# Patient Record
Sex: MALE | Race: WHITE | NOT HISPANIC OR LATINO | Employment: UNEMPLOYED | URBAN - METROPOLITAN AREA
[De-identification: names, ages, dates, MRNs, and addresses within clinical notes are randomized per-mention and may not be internally consistent; named-entity substitution may affect disease eponyms.]

---
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
## 2020-07-29 ENCOUNTER — HOSPITAL ENCOUNTER (EMERGENCY)
Facility: HOSPITAL | Age: 20
Discharge: HOME | End: 2020-07-29
Attending: EMERGENCY MEDICINE | Admitting: EMERGENCY MEDICINE

## 2020-07-29 VITALS
DIASTOLIC BLOOD PRESSURE: 81 MMHG | SYSTOLIC BLOOD PRESSURE: 158 MMHG | HEART RATE: 100 BPM | WEIGHT: 315 LBS | RESPIRATION RATE: 18 BRPM | OXYGEN SATURATION: 98 % | TEMPERATURE: 98.1 F | BODY MASS INDEX: 55.61 KG/M2

## 2020-07-29 DIAGNOSIS — K04.7 DENTAL ABSCESS: Primary | ICD-10-CM

## 2020-07-29 PROCEDURE — 99282 EMERGENCY DEPT VISIT SF MDM: CPT

## 2020-07-29 PROCEDURE — 99284 EMERGENCY DEPT VISIT MOD MDM: CPT | Performed by: PHYSICIAN ASSISTANT

## 2020-07-29 PROCEDURE — 41800 DRAINAGE OF GUM LESION: CPT | Performed by: PHYSICIAN ASSISTANT

## 2020-07-29 RX ORDER — LIDOCAINE HYDROCHLORIDE 20 MG/ML
15 SOLUTION OROPHARYNGEAL ONCE
Status: COMPLETED | OUTPATIENT
Start: 2020-07-29 | End: 2020-07-29

## 2020-07-29 RX ORDER — NAPROXEN 500 MG/1
500 TABLET ORAL 2 TIMES DAILY WITH MEALS
Qty: 14 TABLET | Refills: 0 | Status: SHIPPED | OUTPATIENT
Start: 2020-07-29 | End: 2022-07-20

## 2020-07-29 RX ORDER — PENICILLIN V POTASSIUM 500 MG/1
500 TABLET ORAL 4 TIMES DAILY
Qty: 40 TABLET | Refills: 0 | Status: SHIPPED | OUTPATIENT
Start: 2020-07-29 | End: 2020-08-08

## 2020-07-29 RX ADMIN — LIDOCAINE HYDROCHLORIDE 15 ML: 20 SOLUTION ORAL; TOPICAL at 10:37

## 2022-07-07 ENCOUNTER — HOSPITAL ENCOUNTER (EMERGENCY)
Facility: HOSPITAL | Age: 22
Discharge: HOME | End: 2022-07-07
Attending: EMERGENCY MEDICINE
Payer: COMMERCIAL

## 2022-07-07 VITALS
RESPIRATION RATE: 18 BRPM | HEART RATE: 85 BPM | OXYGEN SATURATION: 95 % | SYSTOLIC BLOOD PRESSURE: 174 MMHG | BODY MASS INDEX: 55.46 KG/M2 | WEIGHT: 315 LBS | TEMPERATURE: 96.4 F | DIASTOLIC BLOOD PRESSURE: 91 MMHG

## 2022-07-07 DIAGNOSIS — G43.909 MIGRAINE: Primary | ICD-10-CM

## 2022-07-07 DIAGNOSIS — S16.1XXA STRAIN OF NECK MUSCLE, INITIAL ENCOUNTER: ICD-10-CM

## 2022-07-07 PROCEDURE — 99284 EMERGENCY DEPT VISIT MOD MDM: CPT | Performed by: PHYSICIAN ASSISTANT

## 2022-07-07 PROCEDURE — 99283 EMERGENCY DEPT VISIT LOW MDM: CPT

## 2022-07-07 PROCEDURE — 96372 THER/PROPH/DIAG INJ SC/IM: CPT

## 2022-07-07 RX ORDER — KETOROLAC TROMETHAMINE 30 MG/ML
15 INJECTION, SOLUTION INTRAMUSCULAR; INTRAVENOUS ONCE
Status: COMPLETED | OUTPATIENT
Start: 2022-07-07 | End: 2022-07-07

## 2022-07-07 RX ORDER — DIPHENHYDRAMINE HCL 25 MG
50 TABLET ORAL ONCE
Status: COMPLETED | OUTPATIENT
Start: 2022-07-07 | End: 2022-07-07

## 2022-07-07 RX ORDER — CYCLOBENZAPRINE HCL 10 MG
10 TABLET ORAL 2 TIMES DAILY PRN
Qty: 20 TABLET | Refills: 0 | Status: SHIPPED | OUTPATIENT
Start: 2022-07-07 | End: 2022-07-20

## 2022-07-07 RX ORDER — ONDANSETRON 4 MG/1
4 TABLET, ORALLY DISINTEGRATING ORAL ONCE
Status: COMPLETED | OUTPATIENT
Start: 2022-07-07 | End: 2022-07-07

## 2022-07-07 RX ORDER — METOCLOPRAMIDE HYDROCHLORIDE 5 MG/ML
10 INJECTION INTRAMUSCULAR; INTRAVENOUS ONCE
Status: DISCONTINUED | OUTPATIENT
Start: 2022-07-07 | End: 2022-07-07

## 2022-07-07 RX ORDER — KETOROLAC TROMETHAMINE 10 MG/1
10 TABLET, FILM COATED ORAL EVERY 6 HOURS PRN
Qty: 20 TABLET | Refills: 0 | Status: SHIPPED | OUTPATIENT
Start: 2022-07-07 | End: 2022-07-20

## 2022-07-07 RX ORDER — KETOROLAC TROMETHAMINE 30 MG/ML
15 INJECTION, SOLUTION INTRAMUSCULAR; INTRAVENOUS ONCE
Status: DISCONTINUED | OUTPATIENT
Start: 2022-07-07 | End: 2022-07-07

## 2022-07-07 RX ORDER — CYCLOBENZAPRINE HCL 10 MG
10 TABLET ORAL ONCE
Status: COMPLETED | OUTPATIENT
Start: 2022-07-07 | End: 2022-07-07

## 2022-07-07 RX ORDER — DIPHENHYDRAMINE HYDROCHLORIDE 50 MG/ML
50 INJECTION INTRAMUSCULAR; INTRAVENOUS ONCE
Status: DISCONTINUED | OUTPATIENT
Start: 2022-07-07 | End: 2022-07-07

## 2022-07-07 RX ADMIN — CYCLOBENZAPRINE HYDROCHLORIDE 10 MG: 10 TABLET, FILM COATED ORAL at 20:47

## 2022-07-07 RX ADMIN — KETOROLAC TROMETHAMINE 15 MG: 30 INJECTION, SOLUTION INTRAMUSCULAR; INTRAVENOUS at 20:52

## 2022-07-07 RX ADMIN — DIPHENHYDRAMINE HCL 50 MG: 25 TABLET ORAL at 20:52

## 2022-07-07 RX ADMIN — ONDANSETRON 4 MG: 4 TABLET, ORALLY DISINTEGRATING ORAL at 20:53

## 2022-07-07 NOTE — Clinical Note
Kasey Jones was seen and treated in our emergency department on 7/7/2022  Diagnosis:     [place]    He may return on this date: 07/08/2022         If you have any questions or concerns, please don't hesitate to call        Matt Galvan PA-C    ______________________________           _______________          _______________  Hospital Representative                              Date                                Time

## 2022-07-08 NOTE — ED PROVIDER NOTES
History  Chief Complaint   Patient presents with    Eye Problem     Patient having blurry vision and states his vision has been going "in and out"     Neck Pain     Patient having upper back and neck pain for a week      Patient presents for an evaluation of headache ongoing intermittently x3 weeks as well as L sided paraspinal cervical musculature pain x1 week  Denies any injury  Patient was seen and evaluated for both issues on 07/03 at [facility]  He does have a follow up with neurology tomorrow  Symptoms will typically resolve after taking NSAIDs  Most recently took Aleve which did help  Headache is usually at his temples and has associated photophobia  Denies any numbness, tingling, weakness, nausea, vomiting, decrease in [place], syncope  No other complaints  Prior to Admission Medications   Prescriptions Last Dose Informant Patient Reported? Taking?   escitalopram (LEXAPRO) 10 mg tablet   No No   Sig: Take 1 tablet by mouth daily At 9AM   naproxen (NAPROSYN) 500 mg tablet   No No   Sig: Take 1 tablet (500 mg total) by mouth 2 (two) times a day with meals for 7 days      Facility-Administered Medications: None       Past Medical History:   Diagnosis Date    Depression     Psychiatric illness     Self-injurious behavior     scratches on L forearm, started 2 days ago, denies previous self-injury       History reviewed  No pertinent surgical history  Family History   Problem Relation Age of Onset    Bipolar disorder Mother     Bipolar disorder Father      I have reviewed and agree with the history as documented      E-Cigarette/Vaping    E-Cigarette Use Never User      E-Cigarette/Vaping Substances    Nicotine No     THC No     CBD No     Flavoring No     Other No      Social History     Tobacco Use    Smoking status: Current Every Day Smoker     Packs/day: 0 50     Years: 1 00     Pack years: 0 50     Types: Cigarettes    Smokeless tobacco: Never Used   Vaping Use    Vaping Use: Never used   Substance Use Topics    Alcohol use: No    Drug use: Yes     Frequency: 1 0 times per week     Types: Marijuana       Review of Systems   Constitutional: Negative for chills and fever  HENT: Negative for congestion, ear pain and sore throat  Eyes: Positive for photophobia  Negative for pain  Respiratory: Negative for cough and shortness of breath  Cardiovascular: Negative for chest pain  Gastrointestinal: Negative for abdominal pain, nausea and vomiting  Genitourinary: Negative for dysuria  Musculoskeletal: Positive for neck pain  Negative for back pain  Skin: Negative for rash  Neurological: Positive for headaches  Negative for dizziness, weakness and numbness  Psychiatric/Behavioral: Negative for suicidal ideas  All other systems reviewed and are negative  Physical Exam  Physical Exam  Vitals reviewed  Constitutional:       General: He is not in acute distress  Appearance: He is well-developed  He is obese  He is not diaphoretic  HENT:      Head: Normocephalic and atraumatic  Right Ear: External ear normal       Left Ear: External ear normal       Nose: Nose normal       Mouth/Throat:      Mouth: Mucous membranes are moist       Pharynx: Oropharynx is clear  Eyes:      Extraocular Movements: Extraocular movements intact  Pupils: Pupils are equal, round, and reactive to light  Cardiovascular:      Rate and Rhythm: Normal rate and regular rhythm  Heart sounds: Normal heart sounds  Pulmonary:      Effort: Pulmonary effort is normal       Breath sounds: Normal breath sounds  Abdominal:      General: Bowel sounds are normal       Palpations: Abdomen is soft  Tenderness: There is no abdominal tenderness  Musculoskeletal:         General: Normal range of motion  Cervical back: Normal range of motion and neck supple  Skin:     General: Skin is warm and dry     Neurological:      Mental Status: He is alert and oriented to person, place, and time       GCS: GCS eye subscore is 4  GCS verbal subscore is 5  GCS motor subscore is 6  Cranial Nerves: Cranial nerves are intact  Sensory: Sensation is intact  Motor: Motor function is intact  Coordination: Coordination is intact  Gait: Gait is intact  Comments: GCS 15  AAOx4  No focal neurological deficits  CN II-XII intact  PERRL  EOMI  No pronator drift   strength 5/5 bilaterally  B/L UE strength 5/5 throughout  Finger to nose normal  Cerebellar function normal  Ambulates without difficulty  B/L LE strength 5/5 throughout   Gross sensation to b/l upper and lower extremities intact              Vital Signs  ED Triage Vitals   Temperature Pulse Respirations Blood Pressure SpO2   07/07/22 2016 07/07/22 2016 07/07/22 2016 07/07/22 2016 07/07/22 2016   (!) 96 4 °F (35 8 °C) 85 18 (!) 174/91 95 %      Temp Source Heart Rate Source Patient Position - Orthostatic VS BP Location FiO2 (%)   07/07/22 2016 07/07/22 2016 07/07/22 2016 07/07/22 2016 --   Tympanic Monitor Sitting Left arm       Pain Score       07/07/22 2052       6           Vitals:    07/07/22 2016   BP: (!) 174/91   Pulse: 85   Patient Position - Orthostatic VS: Sitting         Visual Acuity      ED Medications  Medications   cyclobenzaprine (FLEXERIL) tablet 10 mg (10 mg Oral Given 7/7/22 2047)   diphenhydrAMINE (BENADRYL) tablet 50 mg (50 mg Oral Given 7/7/22 2052)   ketorolac (TORADOL) injection 15 mg (15 mg Intramuscular Given 7/7/22 2052)   ondansetron (ZOFRAN-ODT) dispersible tablet 4 mg (4 mg Oral Given 7/7/22 2053)       Diagnostic Studies  Results Reviewed     None                 No orders to display              Procedures  Procedures         ED Course  ED Course as of 07/07/22 2120   Th Jul 07, 2022 2031 States symptoms had resolved with migraine cocktail at last visit - will administer again   2049 Refusing IV and requesting PO instead                                             MDM  Number of Diagnoses or Management Options  Migraine  Strain of neck muscle, initial encounter  Diagnosis management comments: Refused IV requesting PO medications instead  Symptoms resolved soon after  Also requesting work note  Instructed to follow up with scheduled neurology appointment tomorrow  Agreeable with plan    Patient verbalizes understanding and agrees with plan  The management plan was discussed in detail with the patient at bedside and all questions were answered  Prior to discharge, I provided both verbal and written instructions  I discussed with the patient the signs and symptoms for which to return to the emergency department  All questions were answered and patient was comfortable with the plan of care and discharged to home  The patient agrees to return to the Emergency Department for concerns and/or progression of illness  Disposition  Final diagnoses:   Migraine   Strain of neck muscle, initial encounter     Time reflects when diagnosis was documented in both MDM as applicable and the Disposition within this note     Time User Action Codes Description Comment    7/7/2022  9:09 PM Hair Wells Add [G43 909] Migraine     7/7/2022  9:09 PM Mar Wells Add [Q78  1XXA] Strain of neck muscle, initial encounter       ED Disposition     ED Disposition   Discharge    Condition   Stable    Date/Time   Thu Jul 7, 2022  9:09 PM    Comment   Christal Flor discharge to home/self care                 Follow-up Information     Follow up With Specialties Details Why Contact Info Additional [address]   [address], [address] [facility] [address]  [address], [address], [address] [place], [place], [address]    Neurology [facility]   [address] Allyn Sicard [address] [address] [phone] Neurology [facility], [address] [place], [place], [address] [facility]          Discharge Medication List as of 7/7/2022  9:10 PM      START taking these medications    Details   cyclobenzaprine (FLEXERIL) 10 mg tablet Take 1 tablet (10 mg total) by mouth 2 (two) times a day as needed for muscle spasms, Starting Thu 7/7/2022, Normal      ketorolac (TORADOL) 10 mg tablet Take 1 tablet (10 mg total) by mouth every 6 (six) hours as needed for moderate pain, Starting u 7/7/2022, Normal         CONTINUE these medications which have NOT CHANGED    Details   escitalopram (LEXAPRO) 10 mg tablet Take 1 tablet by mouth daily At 9AM, Starting 6/2/2017, Until Discontinued, Print      naproxen (NAPROSYN) 500 mg tablet Take 1 tablet (500 mg total) by mouth 2 (two) times a day with meals for 7 days, Starting Wed 7/29/2020, Until Wed 8/5/2020, Normal             No discharge procedures on file      PDMP Review     None          ED Provider  Electronically Signed by           Jaime Valera PA-C  07/07/22 8621

## 2022-07-08 NOTE — DISCHARGE INSTRUCTIONS
Please ensure you are following up with your scheduled neurology appointment tomorrow  Use medication as prescribed  Please return to the ER with any worsening symptoms

## 2022-07-13 ENCOUNTER — APPOINTMENT (EMERGENCY)
Dept: RADIOLOGY | Facility: HOSPITAL | Age: 22
DRG: 058 | End: 2022-07-13
Payer: COMMERCIAL

## 2022-07-13 ENCOUNTER — HOSPITAL ENCOUNTER (INPATIENT)
Facility: HOSPITAL | Age: 22
LOS: 7 days | Discharge: ANOTHER HOSPITAL | DRG: 058 | End: 2022-07-20
Attending: EMERGENCY MEDICINE | Admitting: INTERNAL MEDICINE
Payer: COMMERCIAL

## 2022-07-13 DIAGNOSIS — H54.7 DECREASED VISUAL ACUITY: Primary | ICD-10-CM

## 2022-07-13 DIAGNOSIS — G93.2 IIH (IDIOPATHIC INTRACRANIAL HYPERTENSION): ICD-10-CM

## 2022-07-13 DIAGNOSIS — R51.9 ACUTE NONINTRACTABLE HEADACHE, UNSPECIFIED HEADACHE TYPE: ICD-10-CM

## 2022-07-13 PROBLEM — H54.3: Status: ACTIVE | Noted: 2022-07-13

## 2022-07-13 LAB
ANION GAP SERPL CALCULATED.3IONS-SCNC: 6 MMOL/L (ref 4–13)
BASOPHILS # BLD AUTO: 0.04 THOUSANDS/ΜL (ref 0–0.1)
BASOPHILS NFR BLD AUTO: 0 % (ref 0–1)
BUN SERPL-MCNC: 12 MG/DL (ref 5–25)
CALCIUM SERPL-MCNC: 9.8 MG/DL (ref 8.3–10.1)
CHLORIDE SERPL-SCNC: 105 MMOL/L (ref 100–108)
CO2 SERPL-SCNC: 26 MMOL/L (ref 21–32)
CREAT SERPL-MCNC: 0.85 MG/DL (ref 0.6–1.3)
EOSINOPHIL # BLD AUTO: 0.12 THOUSAND/ΜL (ref 0–0.61)
EOSINOPHIL NFR BLD AUTO: 1 % (ref 0–6)
ERYTHROCYTE [DISTWIDTH] IN BLOOD BY AUTOMATED COUNT: 12.8 % (ref 11.6–15.1)
GFR SERPL CREATININE-BSD FRML MDRD: 121 ML/MIN/1.73SQ M
GLUCOSE SERPL-MCNC: 90 MG/DL (ref 65–140)
HCT VFR BLD AUTO: 52.7 % (ref 36.5–49.3)
HGB BLD-MCNC: 18.2 G/DL (ref 12–17)
IMM GRANULOCYTES # BLD AUTO: 0.07 THOUSAND/UL (ref 0–0.2)
IMM GRANULOCYTES NFR BLD AUTO: 1 % (ref 0–2)
LYMPHOCYTES # BLD AUTO: 1.34 THOUSANDS/ΜL (ref 0.6–4.47)
LYMPHOCYTES NFR BLD AUTO: 13 % (ref 14–44)
MCH RBC QN AUTO: 30.6 PG (ref 26.8–34.3)
MCHC RBC AUTO-ENTMCNC: 34.5 G/DL (ref 31.4–37.4)
MCV RBC AUTO: 89 FL (ref 82–98)
MONOCYTES # BLD AUTO: 0.88 THOUSAND/ΜL (ref 0.17–1.22)
MONOCYTES NFR BLD AUTO: 8 % (ref 4–12)
NEUTROPHILS # BLD AUTO: 8.12 THOUSANDS/ΜL (ref 1.85–7.62)
NEUTS SEG NFR BLD AUTO: 77 % (ref 43–75)
NRBC BLD AUTO-RTO: 0 /100 WBCS
PLATELET # BLD AUTO: 178 THOUSANDS/UL (ref 149–390)
PMV BLD AUTO: 10.2 FL (ref 8.9–12.7)
POTASSIUM SERPL-SCNC: 4.2 MMOL/L (ref 3.5–5.3)
RBC # BLD AUTO: 5.95 MILLION/UL (ref 3.88–5.62)
SODIUM SERPL-SCNC: 137 MMOL/L (ref 136–145)
WBC # BLD AUTO: 10.57 THOUSAND/UL (ref 4.31–10.16)

## 2022-07-13 PROCEDURE — 70450 CT HEAD/BRAIN W/O DYE: CPT

## 2022-07-13 PROCEDURE — 36415 COLL VENOUS BLD VENIPUNCTURE: CPT

## 2022-07-13 PROCEDURE — 80048 BASIC METABOLIC PNL TOTAL CA: CPT

## 2022-07-13 PROCEDURE — 93005 ELECTROCARDIOGRAM TRACING: CPT

## 2022-07-13 PROCEDURE — 99285 EMERGENCY DEPT VISIT HI MDM: CPT | Performed by: EMERGENCY MEDICINE

## 2022-07-13 PROCEDURE — G1004 CDSM NDSC: HCPCS

## 2022-07-13 PROCEDURE — 70496 CT ANGIOGRAPHY HEAD: CPT

## 2022-07-13 PROCEDURE — 85025 COMPLETE CBC W/AUTO DIFF WBC: CPT

## 2022-07-13 PROCEDURE — 96374 THER/PROPH/DIAG INJ IV PUSH: CPT

## 2022-07-13 PROCEDURE — 70553 MRI BRAIN STEM W/O & W/DYE: CPT

## 2022-07-13 PROCEDURE — 70498 CT ANGIOGRAPHY NECK: CPT

## 2022-07-13 PROCEDURE — 99285 EMERGENCY DEPT VISIT HI MDM: CPT

## 2022-07-13 PROCEDURE — 70543 MRI ORBT/FAC/NCK W/O &W/DYE: CPT

## 2022-07-13 RX ORDER — TETRACAINE HYDROCHLORIDE 5 MG/ML
2 SOLUTION OPHTHALMIC ONCE
Status: DISCONTINUED | OUTPATIENT
Start: 2022-07-13 | End: 2022-07-13

## 2022-07-13 RX ORDER — LORAZEPAM 2 MG/ML
1 INJECTION INTRAMUSCULAR ONCE
Status: COMPLETED | OUTPATIENT
Start: 2022-07-13 | End: 2022-07-13

## 2022-07-13 RX ORDER — LORAZEPAM 2 MG/ML
0.5 INJECTION INTRAMUSCULAR ONCE
Status: COMPLETED | OUTPATIENT
Start: 2022-07-13 | End: 2022-07-13

## 2022-07-13 RX ADMIN — IOHEXOL 72 ML: 350 INJECTION, SOLUTION INTRAVENOUS at 21:07

## 2022-07-13 RX ADMIN — LORAZEPAM 1 MG: 2 INJECTION INTRAMUSCULAR; INTRAVENOUS at 23:10

## 2022-07-13 RX ADMIN — LORAZEPAM 0.5 MG: 2 INJECTION INTRAMUSCULAR; INTRAVENOUS at 21:45

## 2022-07-13 NOTE — LETTER
[address] [facility] [address]  [address]  [address] [facility] [address]  Dept: [phone]    July 20, 2022     Patient: Valeriano Aaron   YOB: 2000   Date of Visit: 7/13/2022       To Whom it May Concern:    Valeriano Aaron is under my professional care  He was seen in the hospital from 7/13/2022   to 07/20/22  He May return to work after being cleared by [place] [employer]  [facility]     If you have any questions or concerns, please don't hesitate to call           Sincerely,          Aretha Fink MD

## 2022-07-13 NOTE — ED ATTENDING ATTESTATION
7/13/2022  IVirginia, DO, saw and evaluated the patient  I have discussed the patient with the resident/non-physician practitioner and agree with the resident's/non-physician practitioner's findings, Plan of Care, and MDM as documented in the resident's/non-physician practitioner's note, except where noted  All available labs and Radiology studies were reviewed  I was present for key portions of any procedure(s) performed by the resident/non-physician practitioner and I was immediately available to provide assistance  At this point I agree with the current assessment done in the Emergency Department  I have conducted an independent evaluation of this patient a history and physical is as follows:    Patient is a 57-year-old male accompanied by his friend  Patient says that about 2 weeks ago, the day after he was doing some heavy lifting at the gym, he noticed some pain in his upper neck, greatest on the left-hand side, worse when he turned his head to the right  He also noticed a slight gradual headache that following day  He had some mild photophobia and phonophobia, no visual changes  He was reportedly seen at an outside emergency department on July 3rd, treated with a migraine cocktail, and had improvement of his symptoms  Reportedly they wanted to have further testing and evaluation done but the patient left against medical advice  He was recommended to follow up with Neurology, missed that appointment, then was seen again on July 7th at another outside emergency department, for a mild headache which was of some was some photophobia and phonophobia  No visual changes at that point  He says he refused IV treatment, given oral medications for his headache which improved significantly    Patient says 5 days ago he started to notice some dimming in his vision and decreased visual acuity, which has gotten progressively worse since then to the point now where he has difficulty seeing any shapes and has noticed that if he looks at bright light with his right eye he can barely see the bright light, and he can see a little better when he looks just out of the left eye but it is definitely significantly worse  He thinks that colors are also a little bit more muted as well  He does not wear glasses or contacts, has had no trauma, no one else sick with similar headaches at home, no recent head or neck infections or head or neck surgery  General:  Patient is well-appearing  Head:  Atraumatic  Eyes: EOMI bilaterally  At rest both of his pupils are 4 mm  His left pupil constricts to light, there is no APD and the right pupil constricts when light is shown in the left  His right pupil does not constrict to light and there is left pupillary constriction with shining light in the right  Right eye: Lids & lashes unremarkableconjunctiva pink, sclera white, cornea not cloudy or steamy, no hyphema or hypopyon  Left eye: Lids & lashes unremarkable, conjunctiva pink, sclera white, cornea not cloudy or steamy, no hyphema or hypopyon  Visual acuity: OS hand motion, OD hand motion, OU hand motion  Intra-ocular pressure OS 23, OD 22  ENT:  Mucous membranes are moist  Neck:  Supple  Cardiac:  S1-S2, without murmurs  Lungs:  Clear to auscultation bilaterally  Abdomen:  Soft, nontender, normal bowel sounds, no CVA tenderness, no tympany, no rigidity, no guarding  Extremities:  Normal range of motion  Neurologic:  Awake, fluent speech, normal comprehension  AAOx3  Cranial nerves 2-12 are intact, strength is 5/5 in the bilateral upper & lower extremities, no slurred speech, no facial droop, no deficit on finger-to-nose testing, no pronator drift    Sensation to light touch is equal and symmetric throughout the whole body  Skin:  Pink warm and dry, no rash  Psychiatric:  Alert, cooperative, admits to being anxious about his significant decreased vision    ED Course  ED Course as of 07/13/22 5646 Wed Jul 13, 2022 1955 ECG interpreted me, sinus rhythm, rate of 87, no ST segment elevation or depression, no ischemic or infarct changes, no old for comparison   2017 Patient initial blood pressure was taken with a cuff that was too small, repeat blood pressure with appropriate size cuff was 136/79   2116 Pt seen by neurology Dr Aide Laura, recommends MRI of the brain and orbits with and without contrast, would prefer it be obtained tonight if possible  Will discuss with Radiology to see if that is possible  Radiology approved MRI order, patient admitted to Medicine, Neurology following, they will follow-up on the MRI order and managed patient further      Critical Care Time  Procedures

## 2022-07-14 ENCOUNTER — APPOINTMENT (INPATIENT)
Dept: RADIOLOGY | Facility: HOSPITAL | Age: 22
DRG: 058 | End: 2022-07-14
Payer: COMMERCIAL

## 2022-07-14 LAB
ALBUMIN SERPL BCP-MCNC: 3.3 G/DL (ref 3.5–5)
ALP SERPL-CCNC: 60 U/L (ref 46–116)
ALT SERPL W P-5'-P-CCNC: 44 U/L (ref 12–78)
ANION GAP SERPL CALCULATED.3IONS-SCNC: 6 MMOL/L (ref 4–13)
APPEARANCE CSF: CLEAR
APTT PPP: 27 SECONDS (ref 23–37)
AST SERPL W P-5'-P-CCNC: 22 U/L (ref 5–45)
ATRIAL RATE: 87 BPM
BASOPHILS # BLD AUTO: 0.05 THOUSANDS/ΜL (ref 0–0.1)
BASOPHILS NFR BLD AUTO: 1 % (ref 0–1)
BILIRUB SERPL-MCNC: 0.75 MG/DL (ref 0.2–1)
BUN SERPL-MCNC: 11 MG/DL (ref 5–25)
C GATTII+NEOFOR DNA CSF QL NAA+NON-PROBE: NOT DETECTED
CALCIUM ALBUM COR SERPL-MCNC: 10.1 MG/DL (ref 8.3–10.1)
CALCIUM SERPL-MCNC: 9.5 MG/DL (ref 8.3–10.1)
CHLORIDE SERPL-SCNC: 106 MMOL/L (ref 100–108)
CMV DNA CSF QL NAA+NON-PROBE: NOT DETECTED
CO2 SERPL-SCNC: 27 MMOL/L (ref 21–32)
CREAT SERPL-MCNC: 0.81 MG/DL (ref 0.6–1.3)
CRP SERPL QL: 24.7 MG/L
E COLI K1 DNA CSF QL NAA+NON-PROBE: NOT DETECTED
EOSINOPHIL # BLD AUTO: 0.12 THOUSAND/ΜL (ref 0–0.61)
EOSINOPHIL NFR BLD AUTO: 1 % (ref 0–6)
ERYTHROCYTE [DISTWIDTH] IN BLOOD BY AUTOMATED COUNT: 12.8 % (ref 11.6–15.1)
ERYTHROCYTE [SEDIMENTATION RATE] IN BLOOD: 25 MM/HOUR (ref 0–14)
EST. AVERAGE GLUCOSE BLD GHB EST-MCNC: 108 MG/DL
EV RNA CSF QL NAA+NON-PROBE: NOT DETECTED
GFR SERPL CREATININE-BSD FRML MDRD: 124 ML/MIN/1.73SQ M
GLUCOSE CSF-MCNC: 58 MG/DL (ref 50–80)
GLUCOSE SERPL-MCNC: 89 MG/DL (ref 65–140)
GP B STREP DNA CSF QL NAA+NON-PROBE: NOT DETECTED
GRAM STN SPEC: NORMAL
HAEM INFLU DNA CSF QL NAA+NON-PROBE: NOT DETECTED
HBA1C MFR BLD: 5.4 %
HCT VFR BLD AUTO: 49.2 % (ref 36.5–49.3)
HGB BLD-MCNC: 16.5 G/DL (ref 12–17)
HHV6 DNA CSF QL NAA+NON-PROBE: NOT DETECTED
HIV 1+2 AB+HIV1 P24 AG SERPL QL IA: NORMAL
HSV1 DNA CSF QL NAA+NON-PROBE: NOT DETECTED
HSV2 DNA CSF QL NAA+NON-PROBE: NOT DETECTED
IMM GRANULOCYTES # BLD AUTO: 0.05 THOUSAND/UL (ref 0–0.2)
IMM GRANULOCYTES NFR BLD AUTO: 1 % (ref 0–2)
INR PPP: 1 (ref 0.84–1.19)
L MONOCYTOG DNA CSF QL NAA+NON-PROBE: NOT DETECTED
LACTATE CSF-SCNC: 1.6 MMOL/L (ref 0.6–2.2)
LYMPHOCYTES # BLD AUTO: 1.6 THOUSANDS/ΜL (ref 0.6–4.47)
LYMPHOCYTES NFR BLD AUTO: 17 % (ref 14–44)
LYMPHOCYTES NFR CSF MANUAL: 97 %
MAGNESIUM SERPL-MCNC: 1.8 MG/DL (ref 1.6–2.6)
MCH RBC QN AUTO: 30.2 PG (ref 26.8–34.3)
MCHC RBC AUTO-ENTMCNC: 33.5 G/DL (ref 31.4–37.4)
MCV RBC AUTO: 90 FL (ref 82–98)
MONOCYTES # BLD AUTO: 0.93 THOUSAND/ΜL (ref 0.17–1.22)
MONOCYTES NFR BLD AUTO: 10 % (ref 4–12)
MONOS+MACROS CSF MANUAL: 3 %
N MEN DNA CSF QL NAA+NON-PROBE: NOT DETECTED
NEUTROPHILS # BLD AUTO: 6.6 THOUSANDS/ΜL (ref 1.85–7.62)
NEUTS SEG NFR BLD AUTO: 70 % (ref 43–75)
NRBC BLD AUTO-RTO: 0 /100 WBCS
P AXIS: 60 DEGREES
PARECHOVIRUS A RNA CSF QL NAA+NON-PROBE: NOT DETECTED
PLATELET # BLD AUTO: 162 THOUSANDS/UL (ref 149–390)
PMV BLD AUTO: 10.4 FL (ref 8.9–12.7)
POTASSIUM SERPL-SCNC: 3.8 MMOL/L (ref 3.5–5.3)
PR INTERVAL: 122 MS
PROT CSF-MCNC: 30 MG/DL (ref 15–45)
PROT SERPL-MCNC: 7.4 G/DL (ref 6.4–8.2)
PROTHROMBIN TIME: 12.8 SECONDS (ref 11.6–14.5)
QRS AXIS: 72 DEGREES
QRSD INTERVAL: 84 MS
QT INTERVAL: 320 MS
QTC INTERVAL: 385 MS
RBC # BLD AUTO: 5.47 MILLION/UL (ref 3.88–5.62)
RBC # CSF MANUAL: 16 UL (ref 0–10)
S PNEUM DNA CSF QL NAA+NON-PROBE: NOT DETECTED
SODIUM SERPL-SCNC: 139 MMOL/L (ref 136–145)
T WAVE AXIS: 48 DEGREES
TOTAL CELLS COUNTED BLD: NO
TOTAL CELLS COUNTED SPEC: 29
TUBE # CSF: 4
VENTRICULAR RATE: 87 BPM
VZV DNA CSF QL NAA+NON-PROBE: NOT DETECTED
WBC # BLD AUTO: 9.35 THOUSAND/UL (ref 4.31–10.16)
WBC # CSF AUTO: 3 /UL (ref 0–5)

## 2022-07-14 PROCEDURE — 86039 ANTINUCLEAR ANTIBODIES (ANA): CPT | Performed by: PSYCHIATRY & NEUROLOGY

## 2022-07-14 PROCEDURE — 89051 BODY FLUID CELL COUNT: CPT | Performed by: PSYCHIATRY & NEUROLOGY

## 2022-07-14 PROCEDURE — 87798 DETECT AGENT NOS DNA AMP: CPT | Performed by: PSYCHIATRY & NEUROLOGY

## 2022-07-14 PROCEDURE — 83873 ASSAY OF CSF PROTEIN: CPT | Performed by: PSYCHIATRY & NEUROLOGY

## 2022-07-14 PROCEDURE — 89050 BODY FLUID CELL COUNT: CPT | Performed by: PSYCHIATRY & NEUROLOGY

## 2022-07-14 PROCEDURE — 80053 COMPREHEN METABOLIC PANEL: CPT | Performed by: INTERNAL MEDICINE

## 2022-07-14 PROCEDURE — 87483 CNS DNA AMP PROBE TYPE 12-25: CPT | Performed by: PSYCHIATRY & NEUROLOGY

## 2022-07-14 PROCEDURE — 86038 ANTINUCLEAR ANTIBODIES: CPT | Performed by: PSYCHIATRY & NEUROLOGY

## 2022-07-14 PROCEDURE — 87389 HIV-1 AG W/HIV-1&-2 AB AG IA: CPT | Performed by: PSYCHIATRY & NEUROLOGY

## 2022-07-14 PROCEDURE — 86140 C-REACTIVE PROTEIN: CPT | Performed by: PSYCHIATRY & NEUROLOGY

## 2022-07-14 PROCEDURE — 85652 RBC SED RATE AUTOMATED: CPT | Performed by: PSYCHIATRY & NEUROLOGY

## 2022-07-14 PROCEDURE — 87070 CULTURE OTHR SPECIMN AEROBIC: CPT | Performed by: PSYCHIATRY & NEUROLOGY

## 2022-07-14 PROCEDURE — 99255 IP/OBS CONSLTJ NEW/EST HI 80: CPT | Performed by: PSYCHIATRY & NEUROLOGY

## 2022-07-14 PROCEDURE — 70544 MR ANGIOGRAPHY HEAD W/O DYE: CPT

## 2022-07-14 PROCEDURE — 36415 COLL VENOUS BLD VENIPUNCTURE: CPT | Performed by: PSYCHIATRY & NEUROLOGY

## 2022-07-14 PROCEDURE — 83036 HEMOGLOBIN GLYCOSYLATED A1C: CPT

## 2022-07-14 PROCEDURE — 85610 PROTHROMBIN TIME: CPT | Performed by: PSYCHIATRY & NEUROLOGY

## 2022-07-14 PROCEDURE — 009U3ZX DRAINAGE OF SPINAL CANAL, PERCUTANEOUS APPROACH, DIAGNOSTIC: ICD-10-PCS | Performed by: RADIOLOGY

## 2022-07-14 PROCEDURE — 85025 COMPLETE CBC W/AUTO DIFF WBC: CPT | Performed by: INTERNAL MEDICINE

## 2022-07-14 PROCEDURE — 82042 OTHER SOURCE ALBUMIN QUAN EA: CPT | Performed by: PSYCHIATRY & NEUROLOGY

## 2022-07-14 PROCEDURE — 86592 SYPHILIS TEST NON-TREP QUAL: CPT | Performed by: PSYCHIATRY & NEUROLOGY

## 2022-07-14 PROCEDURE — 84157 ASSAY OF PROTEIN OTHER: CPT | Performed by: PSYCHIATRY & NEUROLOGY

## 2022-07-14 PROCEDURE — RECHECK: Performed by: PHYSICIAN ASSISTANT

## 2022-07-14 PROCEDURE — 88108 CYTOPATH CONCENTRATE TECH: CPT | Performed by: SPECIALIST

## 2022-07-14 PROCEDURE — 88184 FLOWCYTOMETRY/ TC 1 MARKER: CPT | Performed by: PSYCHIATRY & NEUROLOGY

## 2022-07-14 PROCEDURE — 99233 SBSQ HOSP IP/OBS HIGH 50: CPT | Performed by: INTERNAL MEDICINE

## 2022-07-14 PROCEDURE — 87476 LYME DIS DNA AMP PROBE: CPT | Performed by: PSYCHIATRY & NEUROLOGY

## 2022-07-14 PROCEDURE — 87102 FUNGUS ISOLATION CULTURE: CPT | Performed by: PSYCHIATRY & NEUROLOGY

## 2022-07-14 PROCEDURE — 99223 1ST HOSP IP/OBS HIGH 75: CPT | Performed by: INTERNAL MEDICINE

## 2022-07-14 PROCEDURE — 62328 DX LMBR SPI PNXR W/FLUOR/CT: CPT

## 2022-07-14 PROCEDURE — 83605 ASSAY OF LACTIC ACID: CPT | Performed by: PSYCHIATRY & NEUROLOGY

## 2022-07-14 PROCEDURE — 93010 ELECTROCARDIOGRAM REPORT: CPT | Performed by: INTERNAL MEDICINE

## 2022-07-14 PROCEDURE — 85730 THROMBOPLASTIN TIME PARTIAL: CPT | Performed by: INTERNAL MEDICINE

## 2022-07-14 PROCEDURE — 86235 NUCLEAR ANTIGEN ANTIBODY: CPT | Performed by: PSYCHIATRY & NEUROLOGY

## 2022-07-14 PROCEDURE — 86255 FLUORESCENT ANTIBODY SCREEN: CPT | Performed by: PSYCHIATRY & NEUROLOGY

## 2022-07-14 PROCEDURE — 83916 OLIGOCLONAL BANDS: CPT | Performed by: PSYCHIATRY & NEUROLOGY

## 2022-07-14 PROCEDURE — 82040 ASSAY OF SERUM ALBUMIN: CPT | Performed by: PSYCHIATRY & NEUROLOGY

## 2022-07-14 PROCEDURE — 82945 GLUCOSE OTHER FLUID: CPT | Performed by: PSYCHIATRY & NEUROLOGY

## 2022-07-14 PROCEDURE — 82784 ASSAY IGA/IGD/IGG/IGM EACH: CPT | Performed by: PSYCHIATRY & NEUROLOGY

## 2022-07-14 PROCEDURE — A9585 GADOBUTROL INJECTION: HCPCS

## 2022-07-14 PROCEDURE — 83735 ASSAY OF MAGNESIUM: CPT | Performed by: INTERNAL MEDICINE

## 2022-07-14 PROCEDURE — 88185 FLOWCYTOMETRY/TC ADD-ON: CPT

## 2022-07-14 PROCEDURE — G1004 CDSM NDSC: HCPCS

## 2022-07-14 RX ORDER — HEPARIN SODIUM 5000 [USP'U]/ML
5000 INJECTION, SOLUTION INTRAVENOUS; SUBCUTANEOUS EVERY 8 HOURS SCHEDULED
Status: DISCONTINUED | OUTPATIENT
Start: 2022-07-14 | End: 2022-07-14

## 2022-07-14 RX ORDER — ACETAZOLAMIDE 250 MG/1
500 TABLET ORAL EVERY 12 HOURS SCHEDULED
Status: DISCONTINUED | OUTPATIENT
Start: 2022-07-14 | End: 2022-07-19

## 2022-07-14 RX ORDER — LORAZEPAM 2 MG/ML
1 INJECTION INTRAMUSCULAR ONCE
Status: COMPLETED | OUTPATIENT
Start: 2022-07-14 | End: 2022-07-14

## 2022-07-14 RX ORDER — LIDOCAINE HYDROCHLORIDE 10 MG/ML
5 INJECTION, SOLUTION EPIDURAL; INFILTRATION; INTRACAUDAL; PERINEURAL
Status: COMPLETED | OUTPATIENT
Start: 2022-07-14 | End: 2022-07-14

## 2022-07-14 RX ORDER — ESCITALOPRAM OXALATE 10 MG/1
10 TABLET ORAL DAILY
Status: DISCONTINUED | OUTPATIENT
Start: 2022-07-14 | End: 2022-07-20 | Stop reason: HOSPADM

## 2022-07-14 RX ORDER — LORAZEPAM 2 MG/ML
INJECTION INTRAMUSCULAR
Status: DISPENSED
Start: 2022-07-14 | End: 2022-07-15

## 2022-07-14 RX ORDER — CYCLOBENZAPRINE HCL 10 MG
10 TABLET ORAL 2 TIMES DAILY PRN
Status: DISCONTINUED | OUTPATIENT
Start: 2022-07-14 | End: 2022-07-18

## 2022-07-14 RX ADMIN — GADOBUTROL 18 ML: 604.72 INJECTION INTRAVENOUS at 00:22

## 2022-07-14 RX ADMIN — LORAZEPAM 1 MG: 2 INJECTION INTRAMUSCULAR; INTRAVENOUS at 19:33

## 2022-07-14 RX ADMIN — ESCITALOPRAM OXALATE 10 MG: 10 TABLET ORAL at 08:52

## 2022-07-14 RX ADMIN — LIDOCAINE HYDROCHLORIDE 5 ML: 10 INJECTION, SOLUTION EPIDURAL; INFILTRATION; INTRACAUDAL; PERINEURAL at 09:45

## 2022-07-14 RX ADMIN — ACETAZOLAMIDE 500 MG: 250 TABLET ORAL at 22:17

## 2022-07-14 NOTE — UTILIZATION REVIEW
Initial Clinical Review    Admission: Date/Time/Statement:   Admission Orders (From admission, onward)     Ordered        07/13/22 1877  INPATIENT ADMISSION  Once                      Orders Placed This Encounter   Procedures    INPATIENT ADMISSION     Standing Status:   Standing     Number of Occurrences:   1     Order Specific Question:   Level of Care     Answer:   Med Surg [16]     Order Specific Question:   Estimated length of stay     Answer:   More than 2 Midnights     Order Specific Question:   Certification     Answer:   I certify that inpatient services are medically necessary for this patient for a duration of greater than two midnights  See H&P and MD Progress Notes for additional information about the patient's course of treatment  ED Arrival Information     Expected   -    Arrival   7/13/2022 18:35    Acuity   Urgent            Means of arrival   Walk-In    Escorted by   Friend    Service   Emergency Medicine    Admission type   Urgent            Arrival complaint   Eye Problem              Chief Complaint   Patient presents with    Medical Problem     Pt reports he has L shoulder and neck pain around 2 weeks and 4-5 days ago he started having "darkening in his vision and trouble seeing," pt seen at [facility] recently and trying to reschedule an appointment with neurologist at this time       Initial Presentation: 23 y o  male , presented to the ED @ [address] [place] [address] from home, walk in with friend  Admitted as Inpatient due to Binocular vision loss  Date: 07/13/2022   Presents with 2 weeks of left sided neck pain and headache and 5 days of decreased visual acuity  The neck pain began after he was lifting weights and gradually migrated to his head  The patient was seen at another ED and was treated with a migraine cocktail which improved his symptoms  The patient refused further workup and subsequently missed his neurology appointment   The pain became increasingly worse and 5 days ago he began having decreased visual acuity that progressively worsened  He states that he can only see vague shapes up close but can see nothing at distance  He does not wear contacts or glasses  He denies trauma, sick contacts and fever  Monitor on telemetry  CTH/CTA negative in the ER  Consult Neurology  Obtain MRI  Consult Ophthalmology  Neuro checks  Day 2: 07/14/2022  Has right apparent pupillary defect on exam   CTH/CTA negative in the ER  Consult Neurology  MRI suggestive of intracranial hypertension  Doubt meningitis  Will get lumbar puncture today w/ opening pressures  Hold on DVT PPX for now  Pending LP labs  Continue lexapro  07/14/2022 Consult Neurology:  Binocular vision loss  Will get another IR LP tomorrow 7/15 evening  Start acetazolamide 500mg BID  Obtain MRV and Venous system study to R/O venous pathology  Follow up CSF and inflammatory workup  Stat CT H for acute decompensation  07/14/2022 MRI brain and orbit w/wo contrast:  Incidental 11 mm cystic lesion centered at left choroid fissure following CSF signal in all sequences without enhancement most compatible with choroid fissure cyst     VTE Pharmacologic Prophylaxis: VTE Score: 3 Moderate Risk (Score 3-4) - Pharmacological DVT Prophylaxis Contraindicated  Sequential Compression Devices Ordered         ED Triage Vitals   Temperature Pulse Respirations Blood Pressure SpO2   07/13/22 1848 07/13/22 1848 07/13/22 1848 07/13/22 1849 07/13/22 1848   98 2 °F (36 8 °C) (!) 116 16 (!) 181/112 97 %      Temp Source Heart Rate Source Patient Position - Orthostatic VS BP Location FiO2 (%)   07/13/22 1848 07/13/22 1848 07/13/22 1849 07/13/22 1849 --   Oral Monitor Sitting Left arm       Pain Score       --                 Wt Readings from Last 1 Encounters:   07/13/22 (!) 185 kg (408 lb)     Additional Vital Signs:   Date/Time Temp Pulse Resp BP MAP (mmHg) SpO2 O2 Device Patient Position - Orthostatic VS   07/14/22 1026 -- 84 18 154/71 -- 98 % None (Room air) Lying   22 0831 98 °F (36 7 °C) 98 18 150/71 -- 95 % None (Room air) Lying   22 0612 -- 88 18 138/76 -- 98 % None (Room air) Lying   22 0149 -- 92 16 145/85 107 96 % None (Room air) Sitting   22 2225 -- 88 18 138/77 -- 97 % None (Room air) Lying   22 -- -- -- 136/79 -- -- -- --   22 -- 92 20 136/79 98 97 % None (Room air) Sitting   22 1849 -- -- -- 181/112 Abnormal  -- -- -- Sitting     Date and Time Eye Opening Best Verbal Response Best Motor Response Kelly Coma Scale Score   22 1852 4 5 6 15     2022 @ 0953  EC, Sinus Rhythm with marked sinus Arrhythmia    Pertinent Labs/Diagnostic Test Results:   [place]-patient lumbar puncture   Final Result by Fer Steward MD ( [id number])   Successful fluoroscopically guided lumbar puncture with an opening pressure of 55 cm H2O  Approximately 25 mL's of clear colorless CSF was removed and sent to lab for requested analysis  Closing pressure was 10 cm H2O  MRI brain and orbits wo and w contrast   Final Result by Irvin Terrazas MD ( [id number])   1  MRI evidences suggesting intracranial hypertension  Recommend lumbar puncture for further evaluation/confirmation  2   No evidence of optic neuritis  3   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       CTA head and neck with and without contrast   Final Result by Gerald Bryant MD ([id number])   No stenosis, dissection or occlusion of the carotid or vertebral arteries or major vessels of the [place]  CT head wo contrast   Final Result by Joel Singh DO (2014)   No acute intracranial abnormality          Results from last 7 days   Lab Units 22  0609 22  1951   WBC Thousand/uL 9 35 10 57*   HEMOGLOBIN g/dL 16 5 18 2*   HEMATOCRIT % 49 2 52 7*   PLATELETS Thousands/uL 162 178   NEUTROS ABS Thousands/µL 6 60 8 12*     Results from last 7 days   Lab Units 22  0609 07/13/22  1951   SODIUM mmol/L 139 137   POTASSIUM mmol/L 3 8 4 2   CHLORIDE mmol/L 106 105   CO2 mmol/L 27 26   ANION GAP mmol/L 6 6   BUN mg/dL 11 12   CREATININE mg/dL 0 81 0 85   EGFR ml/min/1 73sq m 124 121   CALCIUM mg/dL 9 5 9 8   MAGNESIUM mg/dL 1 8  --      Results from last 7 days   Lab Units 07/14/22  0609   AST U/L 22   ALT U/L 44   ALK PHOS U/L 60   TOTAL PROTEIN g/dL 7 4   ALBUMIN g/dL 3 3*   TOTAL BILIRUBIN mg/dL 0 75     Results from last 7 days   Lab Units 07/14/22  0609 07/13/22  1951   GLUCOSE RANDOM mg/dL 89 90     Results from last 7 days   Lab Units 07/14/22  0609   PROTIME seconds 12 8   INR  1 00   PTT seconds 27     Results from last 7 days   Lab Units 07/14/22  0609 07/14/22  0148   CRP mg/L  --  24 7*   SED RATE mm/hour 25*  --      Results from last 7 days   Lab Units 07/14/22  1031   TOTAL COUNTED  29     Results from last 7 days   Lab Units 07/14/22  1031   APPEARANCE CSF  Clear   TUBE NUM CSF  4   WBC CSF /uL 3   XANTHOCHROMIA  No   LYMPHS % (CSF) % 97   MONOCYTES % (CSF) % 3   GLUCOSE CSF mg/dL 58   PROTEIN CSF mg/dL 30   RBC CSF uL 16*       ED Treatment:   Medication Administration from 07/13/2022 1835 to 07/14/2022 1411       Date/Time Order Dose Route Action     07/13/2022 2107 iohexol (OMNIPAQUE) 350 MG/ML injection (SINGLE-DOSE) 100 mL 72 mL Intravenous Given     07/13/2022 2145 LORazepam (ATIVAN) injection 0 5 mg 0 5 mg Intravenous Given     07/13/2022 2310 LORazepam (ATIVAN) injection 1 mg 1 mg Intravenous Given     07/14/2022 0022 Gadobutrol injection (SINGLE-DOSE) SOLN 18 mL 18 mL Intravenous Given     07/14/2022 0852 escitalopram (LEXAPRO) tablet 10 mg 10 mg Oral Given     07/14/2022 0945 lidocaine (PF) (XYLOCAINE-MPF) 1 % injection 5 mL 5 mL Injection Given by Other        Past Medical History:   Diagnosis Date    Depression     Psychiatric illness     Self-injurious behavior     scratches on L forearm, started 2 days ago, denies previous self-injury     Present on Admission:   Severe episode of recurrent major depressive disorder, without psychotic features ([facility] [address] )      Admitting Diagnosis: Eye problem [H57 9]  Age/Sex: 23 y o  male  Admission Orders:  Bed rest for 1 hour  Regular diet  Droplet isolation  Consult PT/OT  Alex SCDs    Scheduled Medications:  escitalopram, 10 mg, Oral, Daily      Continuous IV Infusions:     PRN Meds:  cyclobenzaprine, 10 mg, Oral, BID PRN        IP CONSULT TO NEUROLOGY  IP CONSULT TO OPHTHALMOLOGY    Network Utilization Review Department  ATTENTION: Please call with any questions or concerns to [phone] and carefully listen to the prompts so that you are directed to the right person  All voicemails are confidential   Alicia Skipper all requests for admission clinical reviews, approved or denied determinations and any other requests to dedicated fax number below belonging to the campus where the patient is receiving treatment   List of dedicated fax numbers for the Facilities:  [address] DENIALS (Administrative/Medical Necessity) [phone]   [address] (Maternity/NICU/Pediatrics) [phone]   [address] [place] [address]  [address] [facility] [place] [address] [place] [address] [address] [place] [address] [address] [facility] [place] [address] [phone]

## 2022-07-14 NOTE — ASSESSMENT & PLAN NOTE
At baseline 3 weeks ago patient reports his right eye had 25% visibility (since he was 21years old) and his left eye was normal   Reports going to the gym and lifting weights 2 weeks ago  The day after lifting weights, patient reports his right eye had 10% disability and his left eye  now had 25% disability   He also endorsed a left sided neck pain that radiates to the back of his head  Has right apparent pupillary defect on exam  CTH/CTA negative in the ER  Plan  > admit to medicine on telemetry  Consult Neurology  Spoke with neurology team and they report they will be ordering MRI for patient this evening and that they will also be placing an order for patient to have an LP performed in the morning  Concern for possible demyelination and possible MS  Pending results of MRI, Neurology will left medicine team now if steroid should be started  > consult Ophthalmology  > CVA protocol with neuro checks     > labs

## 2022-07-14 NOTE — ASSESSMENT & PLAN NOTE
Assessment:  Patient is a 23 YOM w/ limited PMH on no medications who p/w binocular vision loss for approximately 4-5 days preceded by left shoulder pain that radiated up the lateral neck and posterior skull to apex of head that started after working out  Visual deficit started as blurring w/ a feeling of eye irritation but no overt pain  At this time, vision remains with no significant improvement despite 2 LPs, second one showing nrml pressure  Unclear if he would benefit from Optic Nerve Sheath Fenestration  Recommend being evaluated by Neuroopthamology at [facility]  See attending attestation for further details       Workup:    CTH/CTA: negative for acute blood, signs of ischemia, LVO or critical stenosis and no vertebral defect concerning for dissection   MRI brain and orbit w/wo results: showed evidence of intracranial HTN with no evidence of demyelinating disease   MRV brain and orbit: significant stenosis of bilateral dural venous sinuses with no evidence of venous thrombosis   Labs: ESR: 25, CRP 24 7   CSF labs:   o lactic, protein, glucose, Meningitis labs normal, negative gram stain  o 07/14 S/p IR LP opening pressure: 55  o 07/16 s/p repeat IR LP opening pressure: 18    Plan:  - Increase Diamox to 1,000 mg q12h  - Order TSH with T4   - CSF NMO AiG Autoantibodies sent t out to [facility] - Results pending  - Recommend following up with [facility] Emergency Department in [place] for Neurophthalmology evaluation

## 2022-07-14 NOTE — PROGRESS NOTES
[facility]  Progress Note - Nate Pham 2000, 23 y o  male MRN: [id number]  Unit/Bed#: ED 05 Encounter: [id number]  Primary Care Provider: Michi Magaña MD   Date and time admitted to hospital: 7/13/2022  6:44 PM    * Binocular vision loss  Assessment & Plan  · At baseline 3 weeks ago patient reports his right eye had 25% visibility (since he was 21years old) and his left eye was normal   · Noticed decreased vision after going to gym  Also w/ left sided neck pain  · Has right apparent pupillary defect on exam  · CTH/CTA negative in the ER  · Consult Neurology  · MRI suggestive of intracranial hypertension  · Doubt meningitis  · Will get lumbar puncture today w/ opening pressures   · Hold on DVT PPX for now   · Pending LP labs       Severe episode of recurrent major depressive disorder, without psychotic features ([facility] [address] )  Assessment & Plan  · Continue Lexapro    BMI 50 0-59 9, adult (HCC)  Assessment & Plan  · Diet lifestyle modifications  · Counseled on weight loss        VTE Pharmacologic Prophylaxis: VTE Score: 3 Moderate Risk (Score 3-4) - Pharmacological DVT Prophylaxis Contraindicated  Sequential Compression Devices Ordered  Patient Centered Rounds: I performed bedside rounds with nursing staff today  Discussions with Specialists or Other Care Team Provider: neurology     Education and Discussions with Family / Patient: Attempted to update  (mother) via phone  Unable to contact  Time Spent for Care: 45 minutes  More than 50% of total time spent on counseling and coordination of care as described above      Current Length of Stay: 1 day(s)  Current Patient Status: Inpatient   Certification Statement: The patient will continue to require additional inpatient hospital stay due to pending LP wi/ neurology   Discharge Plan: Anticipate discharge in 48-72 hrs to pending clinical coarse     Code Status: Level 1 - Full Code    Subjective:   Patient is still with mild headache today  Does have some photophobia  Objective:     Vitals:   Temp (24hrs), Av 2 °F (36 8 °C), Min:98 2 °F (36 8 °C), Max:98 2 °F (36 8 °C)    Temp:  [98 2 °F (36 8 °C)] 98 2 °F (36 8 °C)  HR:  [] 88  Resp:  [16-20] 18  BP: (136-181)/() 138/76  SpO2:  [96 %-98 %] 98 %  Body mass index is 55 33 kg/m²  Input and Output Summary (last 24 hours):   No intake or output data in the 24 hours ending 22    Physical Exam:   Physical Exam  Vitals and nursing note reviewed  Constitutional:       General: He is not in acute distress  Appearance: He is obese  HENT:      Head: Normocephalic and atraumatic  Eyes:      General:         Right eye: No discharge  Left eye: No discharge  Comments: Pupils react sluggish to light    Cardiovascular:      Rate and Rhythm: Normal rate and regular rhythm  Heart sounds: Normal heart sounds  No murmur heard  Pulmonary:      Effort: Pulmonary effort is normal       Breath sounds: Normal breath sounds  No wheezing  Abdominal:      General: Abdomen is flat  Palpations: Abdomen is soft  Musculoskeletal:      Right lower leg: No edema  Left lower leg: No edema  Skin:     General: Skin is warm and dry  Neurological:      Mental Status: He is alert and oriented to person, place, and time  Mental status is at baseline     Psychiatric:         Mood and Affect: Mood normal           Additional Data:     Labs:  Results from last 7 days   Lab Units 22  0609   WBC Thousand/uL 9 35   HEMOGLOBIN g/dL 16 5   HEMATOCRIT % 49 2   PLATELETS Thousands/uL 162   NEUTROS PCT % 70   LYMPHS PCT % 17   MONOS PCT % 10   EOS PCT % 1     Results from last 7 days   Lab Units 22  0609   SODIUM mmol/L 139   POTASSIUM mmol/L 3 8   CHLORIDE mmol/L 106   CO2 mmol/L 27   BUN mg/dL 11   CREATININE mg/dL 0 81   ANION GAP mmol/L 6   CALCIUM mg/dL 9 5   ALBUMIN g/dL 3 3*   TOTAL BILIRUBIN mg/dL 0 75   ALK PHOS U/L 60 ALT U/L 44   AST U/L 22   GLUCOSE RANDOM mg/dL 89     Results from last 7 days   Lab Units 07/14/22  0609   INR  1 00                   Lines/Drains:  Invasive Devices  Report    Peripheral Intravenous Line  Duration           Peripheral IV 07/13/22 Left Antecubital <1 day                      Imaging: Reviewed radiology reports from this admission including: MRI brain    Recent Cultures (last 7 days):         Last 24 Hours Medication List:   Current Facility-Administered Medications   Medication Dose Route Frequency Provider Last Rate    cyclobenzaprine  10 mg Oral BID PRN Florence Sos, DO      escitalopram  10 mg Oral Daily Carolina Sos, DO          Today, Patient Was Seen By: Scooter Mike PA-C    **Please Note: This note may have been constructed using a voice recognition system  **

## 2022-07-14 NOTE — H&P
[address] [facility]  H&P- Jake Tsang 2000, 23 y o  male MRN: [id number]  Unit/Bed#: ED 05 Encounter: [id number]  Primary Care Provider: Valentin Martins MD   Date and time admitted to hospital: 7/13/2022  6:44 PM    * Binocular vision loss  Assessment & Plan  At baseline 3 weeks ago patient reports his right eye had 25% visibility (since he was 21years old) and his left eye was normal   Reports going to the gym and lifting weights 2 weeks ago  The day after lifting weights, patient reports his right eye had 10% disability and his left eye  now had 25% disability   He also endorsed a left sided neck pain that radiates to the back of his head  Has right apparent pupillary defect on exam  CTH/CTA negative in the ER  Plan  > admit to medicine on telemetry  Consult Neurology  Spoke with neurology team and they report they will be ordering MRI for patient this evening and that they will also be placing an order for patient to have an LP performed in the morning  Concern for possible demyelination and possible MS  Pending results of MRI, Neurology will left medicine team now if steroid should be started  > consult Ophthalmology  > CVA protocol with neuro checks  > labs        BMI 50 0-59 9, adult [facility])  Assessment & Plan  Dietary consult    Severe episode of recurrent major depressive disorder, without psychotic features ([facility] [address] )  Assessment & Plan  Continue Lexapro        VTE Prophylaxis: Heparin  / sequential compression device   Code Status: Level 1 - Full Code     Anticipated Length of Stay:  Patient will be admitted on an Inpatient basis with an anticipated length of stay of  > 2 midnights  Justification for Hospital Stay: Please see detailed plans noted above      Chief Complaint:     Vision loss, headache  History of Present Illness:  Jake Tsang is a 23 y o  male who has past medical history significant for depression as well as partial right eye blindness and obesity who presented to [facility] on the evening of 7/13 with worsening vision loss over the last 2 weeks  Patient reports that he 1st had issues with vision loss back when he as 17 and had a similar episode to what occurred 2 weeks ago and lost 75% of his vision in his right eye  Patient reports that he has only had about 25% vision in his right eye since then  He reports his left eye continued to have 100% disability  Patient denies ever seeking medical attention for this  Patient then reports going to the gym and lifting weights 2 weeks ago and then waking up the next morning and having only 10% vision in his right eye and now only 25 present vision in his left eye  Patient also reports left-sided neck pain that radiates to the back of his head  Patient denies any weakness in the face or in any of the extremities or any sensory abnormalities in the extremities or on the face  Patient denies any chest pain or shortness of breath  Patient was asked about his family history of any similar symptoms and he reports that he was not aware of any  Review of Systems:    Constitutional:  Denies fever or chills   Eyes:  Vision loss  HENT:  Denies nasal congestion or sore throat   Respiratory:  Denies cough or shortness of breath   Cardiovascular:  Denies chest pain or edema   GI:  Denies abdominal pain or bloody stools  :  Denies dysuria   Musculoskeletal:  Denies back pain or joint pain   Integument:  Denies rash   Neurologic:  Headache  Endocrine:  Denies polyuria or polydipsia   Lymphatic:  Denies swollen glands   Psychiatric:  Denies depression or anxiety     Past Medical and Surgical History:   Past Medical History:   Diagnosis Date    Depression     Psychiatric illness     Self-injurious behavior     scratches on L forearm, started 2 days ago, denies previous self-injury     History reviewed  No pertinent surgical history      Meds/Allergies:  (Not in a hospital admission)      Allergies: No Known Allergies  History:  Marital Status: Single   Substance Use History:   Social History     Substance and Sexual Activity   Alcohol Use No     Social History     Tobacco Use   Smoking Status Current Every Day Smoker    Packs/day: 0 50    Years: 1 00    Pack years: 0 50    Types: Cigarettes   Smokeless Tobacco Never Used     Social History     Substance and Sexual Activity   Drug Use Yes    Frequency: 1 0 times per week    Types: Marijuana       Family History:  Family History   Problem Relation Age of Onset    Bipolar disorder Mother     Bipolar disorder Father        Physical Exam:     Vitals:   Blood Pressure: 145/85 (07/14/22 0149)  Pulse: 92 (07/14/22 0149)  Temperature: 98 2 °F (36 8 °C) (07/13/22 1848)  Temp Source: Oral (07/13/22 1848)  Respirations: 16 (07/14/22 0149)  Weight - Scale: (!) 185 kg (408 lb) (07/13/22 1848)  SpO2: 96 % (07/14/22 0149)    Constitutional:  Non-toxic appearance  Eyes:  A ferret pupillary defect on the right, decreased vision in both eyes  HENT:   oropharynx moist, external ears normal, external nose normal   Respiratory:  No respiratory distress, no wheezing   Cardiovascular:  Normal rate, no murmurs   GI:  Soft, nondistended, no guarding   :  No costovertebral angle tenderness   Musculoskeletal:  no tenderness, no deformities  Back- no tenderness  Integument:  no jaundice, no rash   Neurologic:  Alert &awake, communicative, CN 2-12 normal,  no focal deficits noted   Psychiatric:  Speech and behavior appropriate       Lab Results: I have personally reviewed pertinent reports        Results from last 7 days   Lab Units 07/13/22 1951   WBC Thousand/uL 10 57*   HEMOGLOBIN g/dL 18 2*   HEMATOCRIT % 52 7*   PLATELETS Thousands/uL 178   NEUTROS PCT % 77*   LYMPHS PCT % 13*   MONOS PCT % 8   EOS PCT % 1     Results from last 7 days   Lab Units 07/13/22 1951   POTASSIUM mmol/L 4 2   CHLORIDE mmol/L 105   CO2 mmol/L 26   BUN mg/dL 12   CREATININE mg/dL 0 85   CALCIUM mg/dL 9 8 Imaging: I have personally reviewed pertinent reports  CTA head and neck with and without contrast    Result Date: 7/13/2022  Narrative: CTA NECK AND BRAIN WITH AND WITHOUT CONTRAST INDICATION: Neck pain and headache after working out 2 weeks ago  Subsequent development of headache and visual changes, mild photophobia  Treated for migraine  Plac colitis  COMPARISON:   None  TECHNIQUE:    Post contrast imaging was performed after administration of iodinated contrast through the neck and brain  Post contrast axial 0 625 mm images timed to opacify the arterial system  3D rendering was performed on an independent workstation  MIP reconstructions performed  Coronal reconstructions were performed of the noncontrast portion of the brain  Radiation dose length product (DLP) for this visit:  487 36 mGy-cm   This examination, like all CT scans performed in the [facility], was performed utilizing techniques to minimize radiation dose exposure, including the use of iterative  reconstruction and automated exposure control  IV Contrast:  72 mL of iohexol (OMNIPAQUE)  IMAGE QUALITY:   Diagnostic FINDINGS: CERVICAL VASCULATURE AORTIC ARCH AND GREAT VESSELS:  Three-vessel configuration aortic arch  Insufficient opacification of the arch and subclavian arteries for diagnostic evaluation secondary to attenuation artifact, due to body habitus  RIGHT VERTEBRAL ARTERY CERVICAL SEGMENT: Origin and proximal cervical segment not well-visualized  Mid and distal cervical segments are patent and normal in caliber  LEFT VERTEBRAL ARTERY CERVICAL SEGMENT: Origin and proximal cervical segment not well-visualized  Distal cervical segment are patent and normal in caliber RIGHT EXTRACRANIAL CAROTID SEGMENT:  Normal caliber common carotid artery  Normal bifurcation and cervical internal carotid artery  No stenosis or dissection  LEFT EXTRACRANIAL CAROTID SEGMENT:  Normal caliber common carotid artery  Normal bifurcation and cervical internal carotid artery  No stenosis or dissection  NASCET criteria was used to determine the degree of internal carotid artery diameter stenosis  INTRACRANIAL VASCULATURE INTERNAL CAROTID ARTERIES:  No stenosis in the carotid siphons  Patent ophthalmic arteries  ANTERIOR CIRCULATION:  Symmetric A1 segments and anterior cerebral arteries with normal enhancement  Normal anterior communicating artery  MIDDLE CEREBRAL ARTERY CIRCULATION:  M1 segment and middle cerebral artery branches demonstrate normal enhancement bilaterally  DISTAL VERTEBRAL ARTERIES:  Nondominant right vertebral artery, hypoplastic distal to the PICA takeoff  Posterior inferior cerebellar arteries are patent  BASILAR ARTERY:  Basilar artery is normal in caliber  Normal superior cerebellar arteries  POSTERIOR CEREBRAL ARTERIES: Right and tiny left posterior communicating arteries visualized  No stenosis in the posterior cerebral arteries    VENOUS STRUCTURES:  Normal  NON VASCULAR ANATOMY BONY STRUCTURES:  No lytic or blastic lesion  SOFT TISSUES OF THE NECK:  No mass or lymphadenopathy  THORACIC INLET:  Clear lung apices  Impression: No stenosis, dissection or occlusion of the carotid or vertebral arteries or major vessels of the [place]  Workstation performed: [id number]     CT head wo contrast    Result Date: 7/13/2022  Narrative: CT BRAIN - WITHOUT CONTRAST INDICATION:   Vision loss, binocular Headche  Decreased visual acuity    COMPARISON:  February 4, 2018 TECHNIQUE:  CT examination of the brain was performed  In addition to axial images, sagittal and coronal 2D reformatted images were created and submitted for interpretation  Radiation dose length product (DLP) for this visit:  823 46 mGy-cm     This examination, like all CT scans performed in the [facility], was performed utilizing techniques to minimize radiation dose exposure, including the use of iterative  reconstruction and automated exposure control  IMAGE QUALITY:  Diagnostic  FINDINGS: PARENCHYMA:  No intracranial mass, mass effect or midline shift  No CT signs of acute infarction  No acute parenchymal hemorrhage  VENTRICLES AND EXTRA-AXIAL SPACES:  Normal for the patient's age  VISUALIZED ORBITS AND PARANASAL SINUSES:  No acute abnormality involving the orbits  Mild scattered sinus mucosal thickening is noted  No fluid levels are seen  CALVARIUM AND EXTRACRANIAL SOFT TISSUES:  Normal      Impression: No acute intracranial abnormality  Workstation performed: [id number]         ** Please Note: Dragon 360 Dictation voice to text software was used in the creation of this document   **

## 2022-07-14 NOTE — PROGRESS NOTES
LP order noted  Jim Palacios is a pleasant 24 y/o male who underwent a fluoroscopy guided lumbar puncture with Diagnostic Radiology today for suspected intracranial hypertension  Opening pressure was 55cm H2O  The patient has been started on Diamox and Neurology is requesting a repeat LP with opening pressures to be performed tomorrow afternoon (Friday 7/15/22)  We will schedule [place] for the repeat LP for tomorrow at 1300  Please obtain AM platelet / PT/ INR  Hold all anticoagulation medications other than ASA 81 mg in anticipation of the LP  The patient will NOT be sedated for the LP, thus he may have breakfast and a light lunch from a Radiology standpoint  Please call us with any questions/concerns       Dewayne Martin [facility]

## 2022-07-14 NOTE — UTILIZATION REVIEW
Inpatient Admission Authorization Request   NOTIFICATION OF INPATIENT ADMISSION/INPATIENT AUTHORIZATION REQUEST   SERVICING FACILITY:   [facility]  Address: [address] [place] [address], [address] [place] [address]  Tax ID: [id number]  NPI: [id number]  Place of Service: Inpatient [address] [facility] Code: 24     ATTENDING PROVIDER:  Attending Name and NPI#: Fany Chauhan Md [[id number]]  Address: [address] [place] [address], [address] [place] [id number]  Phone: [phone]     UTILIZATION REVIEW CONTACT:  Walter Zarate Utilization   Network Utilization Review Department  Phone: [phone]  Fax: [phone]  Email: [email or web address]     PHYSICIAN ADVISORY SERVICES:  FOR ROCX-OO-BMRW REVIEW - MEDICAL NECESSITY DENIAL  Phone: [phone]  Fax: [phone]  Email: [email or web address]     TYPE OF REQUEST:  Inpatient Status     ADMISSION INFORMATION:  ADMISSION DATE/TIME: 7/13/22 10:57 PM  PATIENT DIAGNOSIS CODE/DESCRIPTION:  Eye problem [H57 9]  DISCHARGE DATE/TIME: No discharge date for patient encounter  IMPORTANT INFORMATION:  Please contact Walter Zarate directly with any questions or concerns regarding this request  Department voicemails are confidential     Send requests for admission clinical reviews, concurrent reviews, approvals, and administrative denials due to lack of clinical to fax [phone]

## 2022-07-14 NOTE — INCIDENTAL FINDINGS
Radiographic finding     07/14/2022 MRI brain and orbit w/wo contrast     Incidental 11 mm cystic lesion centered at left choroid fissure following CSF signal in all sequences without enhancement most compatible with choroid fissure cyst     Please notify the following clinician to assist with the follow up:   Dr Krunal Moya MD

## 2022-07-14 NOTE — ED PROVIDER NOTES
History  Chief Complaint   Patient presents with    Medical Problem     Pt reports he has L shoulder and neck pain around 2 weeks and 4-5 days ago he started having "darkening in his vision and trouble seeing," pt seen at [facility] recently and trying to reschedule an appointment with neurologist at this time     The patient is a 23year old male with no significant PMH who presents with 2 weeks of left sided neck pain and headache and 5 days of decreased visual acuity  The neck pain began after he was lifting weights and gradually migrated to his head  The patient was seen at another ED and was treated with a migraine cocktail which improved his symptoms  The patient refused further workup and subsequently missed his neurology appointment  The pain became increasingly worse and 5 days ago he began having decreased visual acuity that progressively worsened  He states that he can only see vague shapes up close but can see nothing at distance  He does not wear contacts or glasses  He denies trauma, sick contacts and fever  Prior to Admission Medications   Prescriptions Last Dose Informant Patient Reported? Taking? cyclobenzaprine (FLEXERIL) 10 mg tablet   No No   Sig: Take 1 tablet (10 mg total) by mouth 2 (two) times a day as needed for muscle spasms   escitalopram (LEXAPRO) 10 mg tablet   No No   Sig: Take 1 tablet by mouth daily At 9AM   ketorolac (TORADOL) 10 mg tablet   No No   Sig: Take 1 tablet (10 mg total) by mouth every 6 (six) hours as needed for moderate pain   naproxen (NAPROSYN) 500 mg tablet   No No   Sig: Take 1 tablet (500 mg total) by mouth 2 (two) times a day with meals for 7 days      Facility-Administered Medications: None       Past Medical History:   Diagnosis Date    Depression     Psychiatric illness     Self-injurious behavior     scratches on L forearm, started 2 days ago, denies previous self-injury       History reviewed  No pertinent surgical history      Family History Problem Relation Age of Onset    Bipolar disorder Mother     Bipolar disorder Father      I have reviewed and agree with the history as documented  E-Cigarette/Vaping    E-Cigarette Use Never User      E-Cigarette/Vaping Substances    Nicotine No     THC No     CBD No     Flavoring No     Other No      Social History     Tobacco Use    Smoking status: Current Every Day Smoker     Packs/day: 0 50     Years: 1 00     Pack years: 0 50     Types: Cigarettes    Smokeless tobacco: Never Used   Vaping Use    Vaping Use: Never used   Substance Use Topics    Alcohol use: No    Drug use: Yes     Frequency: 1 0 times per week     Types: Marijuana        Review of Systems   Constitutional: Positive for activity change and fatigue  Negative for chills and fever  HENT: Negative for ear pain and sore throat  Eyes: Positive for photophobia, pain and visual disturbance  Negative for discharge, redness and itching  Respiratory: Negative for cough and shortness of breath  Cardiovascular: Negative for chest pain and leg swelling  Gastrointestinal: Negative for abdominal pain, diarrhea, nausea and vomiting  Musculoskeletal: Positive for neck pain  Negative for neck stiffness  Neurological: Positive for headaches  Negative for dizziness, tremors, seizures, syncope, facial asymmetry, speech difficulty, weakness, light-headedness and numbness         Physical Exam  ED Triage Vitals   Temperature Pulse Respirations Blood Pressure SpO2   07/13/22 1848 07/13/22 1848 07/13/22 1848 07/13/22 1849 07/13/22 1848   98 2 °F (36 8 °C) (!) 116 16 (!) 181/112 97 %      Temp Source Heart Rate Source Patient Position - Orthostatic VS BP Location FiO2 (%)   07/13/22 1848 07/13/22 1848 07/13/22 1849 07/13/22 1849 --   Oral Monitor Sitting Left arm       Pain Score       --                    Orthostatic Vital Signs  Vitals:    07/13/22 1849 07/13/22 2015 07/13/22 2017 07/13/22 2225   BP: (!) 181/112 136/79 136/79 138/77 Pulse:  92  88   Patient Position - Orthostatic VS: Sitting Sitting  Lying       Physical Exam  Constitutional:       Appearance: Normal appearance  He is obese  HENT:      Head: Normocephalic and atraumatic  Nose: Nose normal       Mouth/Throat:      Mouth: Mucous membranes are moist       Pharynx: Oropharynx is clear  Eyes:      Conjunctiva/sclera: Conjunctivae normal       Comments: Visual acuity intact to hand motion only  Right afferent pupillary defect  Cardiovascular:      Rate and Rhythm: Normal rate and regular rhythm  Pulses: Normal pulses  Heart sounds: Normal heart sounds  Pulmonary:      Effort: Pulmonary effort is normal       Breath sounds: Normal breath sounds  Abdominal:      General: Abdomen is flat  Palpations: Abdomen is soft  Tenderness: There is no abdominal tenderness  There is no guarding or rebound  Musculoskeletal:         General: No swelling or tenderness  Normal range of motion  Cervical back: Normal range of motion  No rigidity or tenderness  Skin:     General: Skin is warm and dry  Findings: No rash  Neurological:      Mental Status: He is alert and oriented to person, place, and time  Cranial Nerves: No cranial nerve deficit  Sensory: No sensory deficit  Motor: No weakness        Coordination: Coordination normal       Gait: Gait normal       Deep Tendon Reflexes: Reflexes normal          ED Medications  Medications   cyclobenzaprine (FLEXERIL) tablet 10 mg (has no administration in time range)   escitalopram (LEXAPRO) tablet 10 mg (has no administration in time range)   iohexol (OMNIPAQUE) 350 MG/ML injection (SINGLE-DOSE) 100 mL (72 mL Intravenous Given 7/13/22 2107)   LORazepam (ATIVAN) injection 0 5 mg (0 5 mg Intravenous Given 7/13/22 2145)   LORazepam (ATIVAN) injection 1 mg (1 mg Intravenous Given 7/13/22 2310)   Gadobutrol injection (SINGLE-DOSE) SOLN 18 mL (18 mL Intravenous Given 7/14/22 0022) Diagnostic Studies  Results Reviewed     Procedure Component Value Units Date/Time    Red Top-MS Panel [[id number]]     Lab Status: No result Specimen: Blood     Oligoclonal banding [[id number]]     Lab Status: No result Specimen: Cerebrospinal Fluid     CDS1 (central demyelinating); CDS1; [facility] - Miscellaneous Test [[id number]]     Lab Status: No result Specimen:  Body Fluid from Other     JUAN, body fluid [[id number]]     Lab Status: No result Specimen: Cerebrospinal Fluid from Lumbar Puncture     CSF white cell count with differential [[id number]]     Lab Status: No result Specimen: Cerebrospinal Fluid from Lumbar Puncture     Fungal culture [[id number]]     Lab Status: No result Specimen: Cerebrospinal Fluid from Lumbar Puncture     Glucose CSF [[id number]]     Lab Status: No result Specimen: Cerebrospinal Fluid from Lumbar Puncture     CSF VDRL [[id number]]     Lab Status: No result Specimen: Cerebrospinal Fluid from Lumbar Puncture     Protein CSF [[id number]]     Lab Status: No result Specimen: Cerebrospinal Fluid from Lumbar Puncture     RBC count,CSF [[id number]]     Lab Status: No result Specimen: Cerebrospinal Fluid from Lumbar Puncture     Gram stain CSF [[id number]]     Lab Status: No result Specimen: Cerebrospinal Fluid from Lumbar Puncture     IgG Synthesis/Index Rate, CSF [[id number]]     Lab Status: No result Specimen: Cerebrospinal Fluid from Lumbar Puncture     Red Top-IGG Synthesis, Index Rate/Serum [[id number]]     Lab Status: No result Specimen: Blood     CHERISE Virus by PCR,CSF [[id number]]     Lab Status: No result Specimen: Cerebrospinal Fluid from Lumbar Puncture     Lactic acid, CSF [[id number]]     Lab Status: No result Specimen: Cerebrospinal Fluid from Lumbar Puncture     Leukemia/Lymphoma flow cytometry [[id number]]     Lab Status: No result Specimen: Cerebrospinal Fluid from Lumbar Puncture     Lyme disease, PCR [[id number]]     Lab Status: No result Specimen: Cerebrospinal Fluid from Other Meningitis/Encephalitis (ME) Panel [[id number]]     Lab Status: No result Specimen: Cerebrospinal Fluid from Lumbar Puncture     MS Panel, CSF [[id number]]     Lab Status: No result Specimen: Cerebrospinal Fluid from Lumbar Puncture     C-reactive protein [[id number]]     Lab Status: No result Specimen: Blood     Sedimentation rate, automated [[id number]]     Lab Status: No result Specimen: Blood     JUAN Screen w/ Reflex to Titer/Pattern [[id number]]     Lab Status: No result Specimen: Blood     Sjogren's Antibodies [[id number]]     Lab Status: No result Specimen: Blood     HIV 1/2 ANTIGEN/ANTIBODY (4TH GENERATION) W REFLEX [facility] [[id number]]     Lab Status: No result Specimen: Blood     Basic metabolic panel [[id number]] Collected: 07/13/22 1951    Lab Status: Final result Specimen: Blood from Arm, Left Updated: 07/13/22 2027     Sodium 137 mmol/L      Potassium 4 2 mmol/L      Chloride 105 mmol/L      CO2 26 mmol/L      ANION GAP 6 mmol/L      BUN 12 mg/dL      Creatinine 0 85 mg/dL      Glucose 90 mg/dL      Calcium 9 8 mg/dL      eGFR 121 ml/min/1 73sq m     Narrative:      [place] guidelines for Chronic Kidney Disease (CKD):     Stage 1 with normal or high GFR (GFR > 90 mL/min/1 73 square meters)    Stage 2 Mild CKD (GFR = 60-89 mL/min/1 73 square meters)    Stage 3A Moderate CKD (GFR = 45-59 mL/min/1 73 square meters)    Stage 3B Moderate CKD (GFR = 30-44 mL/min/1 73 square meters)    Stage 4 Severe CKD (GFR = 15-29 mL/min/1 73 square meters)    Stage 5 End Stage CKD (GFR <15 mL/min/1 73 square meters)  Note: GFR calculation is accurate only with a steady state creatinine    CBC and differential [[id number]]  (Abnormal) Collected: 07/13/22 1951    Lab Status: Final result Specimen: Blood from Arm, Left Updated: 07/13/22 2004     WBC 10 57 Thousand/uL      RBC 5 95 Million/uL      Hemoglobin 18 2 g/dL      Hematocrit 52 7 %      MCV 89 fL      MCH 30 6 pg      MCHC 34 5 g/dL      RDW 12 8 %      MPV 10 2 fL      Platelets 071 Thousands/uL      nRBC 0 /100 WBCs      Neutrophils Relative 77 %      Immat GRANS % 1 %      Lymphocytes Relative 13 %      Monocytes Relative 8 %      Eosinophils Relative 1 %      Basophils Relative 0 %      Neutrophils Absolute 8 12 Thousands/µL      Immature Grans Absolute 0 07 Thousand/uL      Lymphocytes Absolute 1 34 Thousands/µL      Monocytes Absolute 0 88 Thousand/µL      Eosinophils Absolute 0 12 Thousand/µL      Basophils Absolute 0 04 Thousands/µL                  CTA head and neck with and without contrast   Final Result by Dino Trevino MD (07/13 2141)      No stenosis, dissection or occlusion of the carotid or vertebral arteries or major vessels of the [place]  Workstation performed: [id number]         CT head wo contrast   Final Result by Aryan Fernandez DO (07/13 2014)      No acute intracranial abnormality  Workstation performed: [id number]         MRI brain and orbits wo and w contrast    (Results Pending)   [place] IN-patient lumbar puncture    (Results Pending)         Procedures  Procedures      ED Course  ED Course as of 07/14/22 0135   Wed Jul 13, 2022 2021 CT head wo contrast   2022 No acute intracranial abnormality  2134 Spoke with the radiologist, Dr Purvi Garcia who approved the MRI  2153 CTA head and neck with and without contrast  No stenosis, dissection or occlusion of the carotid or vertebral arteries or major vessels of the [place]  SBIRT 20yo+    Flowsheet Row Most Recent Value   SBIRT (23 yo +)    In order to provide better care to our patients, we are screening all of our patients for alcohol and drug use  Would it be okay to ask you these screening questions?  No Filed at: 07/13/2022 1851                MDM  Number of Diagnoses or Management Options  Acute nonintractable headache, unspecified headache type  Decreased visual acuity  Diagnosis management comments: 23year old male with no significant past medical history who presents with 2 weeks of left sided neck pain and headache and 5 days of decreased visual acuity  The patient's vision is intact to hand motion only  Interocular pressure is 22 bilaterally  On exam he has a right afferent pupillary defect, but no other neurologic deficits  The patient's symptoms are concerning for optic neuritis  Neurology is consulted and after evaluating the patient they requested a CTA of the head and neck and a MRI of the brain and orbits with and without contrast  The patient was admitted to [facility]  Disposition  Final diagnoses:   Decreased visual acuity   Acute nonintractable headache, unspecified headache type     Time reflects when diagnosis was documented in both MDM as applicable and the Disposition within this note     Time User Action Codes Description Comment    7/13/2022  8:35 PM Jetty Fermo A Add [H54 7] Decreased visual acuity     7/13/2022 10:53 PM Jetty Fermo A Add [R51 9] Acute nonintractable headache, unspecified headache type       ED Disposition     ED Disposition   Admit    Condition   Stable    Date/Time   Wed Jul 13, 2022 10:55 PM    Comment   Case was discussed with Dr Taya Hernandez and the patient's admission status was agreed to be Admission Status: inpatient status to the service of Dr Taya Hernandez   Follow-up Information    None         Patient's Medications   Discharge Prescriptions    No medications on file     No discharge procedures on file  PDMP Review     None           ED Provider  Attending physically available and evaluated Margarita Keane I managed the patient along with the ED Attending      Electronically Signed by         Dona Zamudio DO  07/14/22 0098

## 2022-07-14 NOTE — ASSESSMENT & PLAN NOTE
· At baseline 3 weeks ago patient reports his right eye had 25% visibility (since he was 21years old) and his left eye was normal   · Noticed decreased vision after going to gym  Also w/ left sided neck pain  · Has right apparent pupillary defect on exam  · CTH/CTA negative in the ER  · admit to medicine on telemetry  · Consult Neurology  · Concern for demylenation process  · MRI w/ evidence of intracranial hypertension   · Will get lumbar puncture today w/ opening pressures   · Hold on DVT PPX for now   · CVA protocol with neuro checks     ·  labs

## 2022-07-14 NOTE — ED NOTES
Per Dr Mckayla Armijo and MIS keep pt NPO at this time until [address] [place] [address], RN  07/14/22 2272

## 2022-07-14 NOTE — ED NOTES
Neuro at bedside, pt is GCS 15, pupils are 3 equal and reactive to light, pt having difficulty with peripheral vision      Jono Jenkins RN  07/14/22 1297

## 2022-07-14 NOTE — CONSULTS
NEUROLOGY RESIDENCY CONSULT NOTE     Name: Valentine Castro   Age & Sex: 23 y o  male   MRN: [id number]  Unit/Bed#: ED 05   Encounter: [id number]  Length of Stay: 1    Recommendations for outpatient neurological follow up have yet to be determined  Pending for d/c: workup, clinical improvement    ASSESSMENT & PLAN     24 yo M without significant PMhx presented with progressive binocular painless vision loss (R worse than L) over a week span with physical finding of blurring of optic disc margins b/l  MRI showing evidence of intracranial HTN  S/p IR LP: opening pressure 55  Likely has idiopathic intracranial hypertension  Initiated acetazolamide   - Repeat LP with opening pressure tomorrow, MRV and venous system study to rule out venous pathology and awaiting other CSF workup and inflammatory labs  * Binocular vision loss  Assessment & Plan   Valentine Castro is a 23 y o  male w/ limited PMH on no medications who p/w binocular vision loss for approximately 4-5 days preceded by left shoulder pain that radiated up the lateral neck and posterior skull to apex of head that started after working out  Visual deficit started as blurring w/ a feeling of eye irritation but no overt pain   Overall labs thus far unremarkable     Workup:   o CTH/CTA: negative for acute blood, signs of ischemia, LVO or critical stenosis and no vertebral defect concerning for dissection  o MRI brain and orbit w/wo results: showed evidence of intracranial HTN with no evidence of demyelinating disease  o Labs: ESR: 25, CRP 24 7  o CSF labs: lactic, protein, glucose normal  o IR LP opening pressure: 55     Exam 7/14 w/ R > L visual loss, color desaturation, bilateral pupils equal and reactive, right defect in abduction (unable to follow fingers 2/2 acuity loss, inability to bury sclera to the right on verbal instruction); difficulty in obtaining reflexes but patient's strength and sensation intact and he is 405 lbs and unable to easily relax due to anxiety); bilateral blurring of optic discs on fundoscopic exam this am      Plan:   Will get another IR LP tomorrow 07/15 evening   Start acetazolamide 500mg BID   MRV and venous system study to rule out venous pathology   Pending CSF labs and inflammatory workup   Stat CTH for acute decompensation   Ophthalmology consulted and appreciated recommendation   Other medical management per primary team   Neurology will continue to follow      SUBJECTIVE     Reason for Consult / Principal Problem: vision loss  Hx and PE limited by: n/a    HPI: Marielle Jo is a 23 y o  male w/ limited PMH who p/w 4-5 days of binocular, R > L vision loss that has deteriorated to the point where he cannot follow fingers to test EOM  Patient reports that he was in his normal state of health until he was working out several days ago  After that he was experiencing fairly significant left shoulder and then neck pain for which he went to ED and received muscle relaxant and antiinflammatory medication  The pain then migrated to the posterior skull and then apex of his head and about 4-5 days ago his vision started to blur  This happened gradually and was worse in the right eye  Not accompanied by sharp pain but patient did experience some mild irritation at times  He exhibits a right APD  Overnight exam: his acuity is so impaired that he is unable to follow fingers to do EOM testing  EOM testing by verbal commands revealed a right CN VI weakness  He appears otherwise neurologically intact and is not complaining of a headache currently  No family history of inflammatory or autoimmune conditions, no reported personal or family neurological history  No recent illnesses or sick contacts, no major changes to daily life recently  Patient denies N/V/F/C, abdominal pain, dizziness, new weakness or sensory changes, bowel or bladder dysfunction  This morning, patient was seen and examined  He has equal pupillary reflex b/l  Right CN VI weakness  Fundoscopic exam shows blurring of optic discs margins b/l  Other neurological exams are unremarkable  Patient denies headache, nausea/vomiting, any pain  Inpatient consult to Neurology  Consult performed by: Ara Silver MD  Consult ordered by: Terry Washington DO        Historical Information   Past Medical History:   Diagnosis Date    Depression     Psychiatric illness     Self-injurious behavior     scratches on L forearm, started 2 days ago, denies previous self-injury     Past Surgical History:   Procedure Laterality Date    FL LUMBAR PUNCTURE DIAGNOSTIC  7/14/2022     Social History   Social History     Substance and Sexual Activity   Alcohol Use No     Social History     Substance and Sexual Activity   Drug Use Yes    Frequency: 1 0 times per week    Types: Marijuana     E-Cigarette/Vaping    E-Cigarette Use Never User      E-Cigarette/Vaping Substances    Nicotine No     THC No     CBD No     Flavoring No     Other No      Social History     Tobacco Use   Smoking Status Current Every Day Smoker    Packs/day: 0 50    Years: 1 00    Pack years: 0 50    Types: Cigarettes   Smokeless Tobacco Never Used     Family History:   Family History   Problem Relation Age of Onset    Bipolar disorder Mother     Bipolar disorder Father      Meds/Allergies   all current active meds have been reviewed and PTA meds:   Prior to Admission Medications   Prescriptions Last Dose Informant Patient Reported? Taking?    cyclobenzaprine (FLEXERIL) 10 mg tablet   No No   Sig: Take 1 tablet (10 mg total) by mouth 2 (two) times a day as needed for muscle spasms   escitalopram (LEXAPRO) 10 mg tablet   No No   Sig: Take 1 tablet by mouth daily At 9AM   ketorolac (TORADOL) 10 mg tablet   No No   Sig: Take 1 tablet (10 mg total) by mouth every 6 (six) hours as needed for moderate pain   naproxen (NAPROSYN) 500 mg tablet   No No   Sig: Take 1 tablet (500 mg total) by mouth 2 (two) times a day with meals for 7 days      Facility-Administered Medications: None     No Known Allergies    Previous medical records under review    Review of Systems   Constitutional: Negative for chills and fever  HENT: Negative for ear pain and sore throat  Eyes: Positive for photophobia, pain and visual disturbance  Negative for redness  Respiratory: Negative for cough and shortness of breath  Cardiovascular: Negative for chest pain and palpitations  Gastrointestinal: Negative for abdominal pain and vomiting  Genitourinary: Negative for dysuria and hematuria  Musculoskeletal: Negative for arthralgias and back pain  Skin: Negative for color change and rash  Neurological: Negative for seizures and syncope  All other systems reviewed and are negative  13 point ROS negative unless otherwise specified    OBJECTIVE     Patient ID: Lilia Birmingham is a 23 y o  male  Vitals:   Vitals:    22 0149 22 0612 22 0831 22 1026   BP: 145/85 138/76 150/71 154/71   BP Location: Left arm Right arm Right arm Right arm   Pulse: 92 88 98 84   Resp: 16 18 18 18   Temp:   98 °F (36 7 °C)    TempSrc:   Oral    SpO2: 96% 98% 95% 98%   Weight:          Body mass index is 55 33 kg/m²  No intake or output data in the 24 hours ending 22 1513    Temperature: Temp (24hrs), Av 1 °F (36 7 °C), Min:98 °F (36 7 °C), Max:98 2 °F (36 8 °C)   Temperature: 98 °F (36 7 °C)    Invasive Devices: Invasive Devices  Report    Peripheral Intravenous Line  Duration           Peripheral IV 22 Left Antecubital <1 day                Physical Exam Vitals reviewed  Constitutional:    Not in acute distress  Anxious  Elevated BMI  Not ill-appearing, toxic-appearing or diaphoretic  HENT:   Normocephalic and atraumatic  External ear normal b/l  Nose normal  No congestion or rhinorrhea  Mucous membranes are moist   Oropharynx is clear  No oropharyngeal exudate or posterior oropharyngeal erythema     Eyes:    No scleral icterus  No discharge b/l  Conjunctivae normal    Pulmonary:  Pulmonary effort is normal  No respiratory distress  GI: abdomen not distended  Musculoskeletal: no gross deformities  Skin:   Skin is not pale  Psychiatric:       Mood axious  Affect congruent    Neurologic Exam:   Mental Status   Alert and oriented to person, place, and time  Attention is normal  Speech is fluent w/o dysarthria     Cranial Nerves   CN II all four quadrants able to detect movement but exam limited by significant impaired visual acuity R > L   CN III, IV, VI no CN III palsy, right CN VI weakness, no clear nystagmus   CN V   Facial sensation symmetric  CN VII  Facial expression full, symmetric  CN VIII Hearing roughly symmetric  CN IX, X Palate symmetric  CN XI trapezius strength symmetric  CN XII no dysarthria, symmetric lingual protrusion  Motor Exam   Muscle bulk and tone grossly normal; Pronator drift absent b/l  Strength intact and symmetric BUE/BLE     Sensory Exam   Light touch intact and symmetric BUE/BLE    Gait, Coordination, and Reflexes   FTN normal but HTS intact b/l; no significant tremor observed  Reflexes difficult to obtain but patient w/ elevated BMI and inability to relax 2/2 anxiety  Plantar response equivocal b/l  Gait: deferred    LABORATORY DATA     Labs: I have personally reviewed pertinent reports      Results from last 7 days   Lab Units 07/14/22 0609 07/13/22 1951   WBC Thousand/uL 9 35 10 57*   HEMOGLOBIN g/dL 16 5 18 2*   HEMATOCRIT % 49 2 52 7*   PLATELETS Thousands/uL 162 178   NEUTROS PCT % 70 77*   MONOS PCT % 10 8      Results from last 7 days   Lab Units 07/14/22 0609 07/13/22 1951   POTASSIUM mmol/L 3 8 4 2   CHLORIDE mmol/L 106 105   CO2 mmol/L 27 26   BUN mg/dL 11 12   CREATININE mg/dL 0 81 0 85   CALCIUM mg/dL 9 5 9 8   ALK PHOS U/L 60  --    ALT U/L 44  --    AST U/L 22  --      Results from last 7 days   Lab Units 07/14/22  0609   MAGNESIUM mg/dL 1 8          Results from last 7 days   Lab Units 07/14/22  0609   INR  1 00   PTT seconds 27               IMAGING & DIAGNOSTIC TESTING     Radiology Results: I have personally reviewed pertinent reports  and I have personally reviewed pertinent films in PACS  [facility] [place]-patient lumbar puncture   Final Result by Dalton Junior MD (07/14 1326)      Successful fluoroscopically guided lumbar puncture with an opening pressure of 55 cm H2O  Approximately 25 mL's of clear colorless CSF was removed and sent to lab for requested analysis  Closing pressure was 10 cm H2O  I reviewed the above findings and procedure with Dr Effie Leach  PERFORMED, DICTATED AND SIGNED BY: Willy Lane PA-C      Workstation performed: [id number]         MRI brain and orbits wo and w contrast   Final Result by Alyx Lopes MD (07/14 3111)      1  MRI evidences suggesting intracranial hypertension  Recommend lumbar puncture for further evaluation/confirmation  2   No evidence of optic neuritis  3   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             Workstation performed: [id number]         CTA head and neck with and without contrast   Final Result by Kelsey Mary MD (07/13 2141)      No stenosis, dissection or occlusion of the carotid or vertebral arteries or major vessels of the [place]  Workstation performed: [id number]         CT head wo contrast   Final Result by Ana Cristina Silveira DO (07/13 2014)      No acute intracranial abnormality  Workstation performed: [id number]         MRI inpatient order    (Results Pending)   FL lumbar puncture diagnostic    (Results Pending)       Other Diagnostic Testing: I have personally reviewed pertinent reports        ACTIVE MEDICATIONS     Current Facility-Administered Medications   Medication Dose Route Frequency    acetaZOLAMIDE (DIAMOX) tablet 500 mg  500 mg Oral Q12H [address]    cyclobenzaprine (FLEXERIL) tablet 10 mg  10 mg Oral BID PRN    escitalopram (LEXAPRO) tablet 10 mg  10 mg Oral Daily       CODE STATUS & ADVANCED DIRECTIVES     Code Status: Level 1 - Full Code  Advance Directive and Living Will:      Power of :    POLST:      VTE Pharmacologic Prophylaxis: Enoxaparin (Lovenox)  VTE Mechanical Prophylaxis: sequential compression device    ==  Mary Ojeda MD  Resident, Neurology, PGY-2  [address]

## 2022-07-14 NOTE — QUICK NOTE
NEUROLOGY RESIDENCY OVERNIGHT CONSULT QUICK NOTE     Name: Julia Alston   Age & Sex: 23 y o  male   MRN: [id number]  Unit/Bed#: ED 05   Encounter: [id number]  Length of Stay: 0    Recommendations for outpatient neurological follow up have yet to be determined  Pending for d/c: workup, clinical improvement    ASSESSMENT & PLAN     Binocular vision loss  Assessment & Plan  Julia Alston is a 23 y o  male w/ limited PMH on no medications who p/w binocular vision loss for approximately 4-5 days concerning for inflammatory vs demyelinating vs cerebrovascular vs increased ICP etiology   Patient will need MRI brain and orbits w/wo and LP w/ opening pressure and inflammatory labs including [facility] CDS1   Preceded by several days of left shoulder pain that radiated up the lateral neck and posterior skull to apex of head that started after working out  Virool deficit started as blurring w/ a feeling of eye irritation but no overt pain   Overall labs thus far unremarkable but would also complete inflammatory workup as below   Workup:  o CTH/CTA: negative for acute blood, signs of ischemia, LVO or critical stenosis and no vertebral defect concerning for dissection   Exam w/ R > L visual loss, color desaturation, right APD, right defect in abduction (unable to follow fingers 2/2 acuity loss, inability to bury sclera to the right on verbal instruction); difficulty in obtaining reflexes but patient's strength and sensation intact and he is 405 lbs and unable to easily relax due to anxiety); difficulty visualizing the right-sided fundus on exam this evening - recommend repeat fundoscopic exam    Plan:   Admit for further workup   MRI brain and orbits w/wo contrast - patient will need medication for anxiety   If signs of active demyelination concerning for MS, next steps would be pulse steroids (typically 1 g solumedrol x 5 days)   CMP, CBCD, ESR, CRP, JUAN, Sjogrens, other inflammatory labs if warranted   Patient should have a lumbar puncture w/ opening pressure and regular and inflammatory labs as ordered (day team may have additional labs to add) - would hold any VTE PPX until after LP completed   Stat CTH for acute decompensation   Patient reports he has been unable to eat much over the last several days due to being unable to easily move around the home w/ his vision loss - neuro has no problem with him starting a diet at this point   Elena Mccarthy Ophthalmology consult       SUBJECTIVE     Reason for Consult / Principal Problem: vision loss  Hx and PE limited by: n/a    HPI: Tammy Moeller is a 23 y o  male w/ limited PMH who p/w 4-5 days of binocular, R > L vision loss that has deteriorated to the point where he cannot follow fingers to test EOM  Patient reports that he was in his normal state of health until he was working out several days ago  After that he was experiencing fairly significant left shoulder and then neck pain for which he went to ED and received muscle relaxant and antiinflammatory medication  The pain then migrated to the posterior skull and then apex of his head and about 4-5 days ago his vision started to blur  This happened gradually and was worse in the right eye  Not accompanied by sharp pain but patient did experience some mild irritation at times  He exhibits a right APD  On exam today his acuity is so impaired that he is unable to follow fingers to do EOM testing  EOM testing by verbal commands revealed a right CN VI weakness  He appears otherwise neurologically intact and is not complaining of a headache currently  No family history of inflammatory or autoimmune conditions, no reported personal or family neurological history  No recent illnesses or sick contacts, no major changes to daily life recently  Patient denies N/V/F/C, abdominal pain, dizziness, new weakness or sensory changes, bowel or bladder dysfunction       Inpatient consult to Neurology  Consult performed by: Bridgette Ma, MD  Consult ordered by: Roby Friend, DO    Historical Information   Past Medical History:   Diagnosis Date    Depression     Psychiatric illness     Self-injurious behavior     scratches on L forearm, started 2 days ago, denies previous self-injury     History reviewed  No pertinent surgical history  Social History   Social History     Substance and Sexual Activity   Alcohol Use No     Social History     Substance and Sexual Activity   Drug Use Yes    Frequency: 1 0 times per week    Types: Marijuana     E-Cigarette/Vaping    E-Cigarette Use Never User      E-Cigarette/Vaping Substances    Nicotine No     THC No     CBD No     Flavoring No     Other No      Social History     Tobacco Use   Smoking Status Current Every Day Smoker    Packs/day: 0 50    Years: 1 00    Pack years: 0 50    Types: Cigarettes   Smokeless Tobacco Never Used     Family History:   Family History   Problem Relation Age of Onset    Bipolar disorder Mother     Bipolar disorder Father      Meds/Allergies   all current active meds have been reviewed and PTA meds:   Prior to Admission Medications   Prescriptions Last Dose Informant Patient Reported? Taking? cyclobenzaprine (FLEXERIL) 10 mg tablet   No No   Sig: Take 1 tablet (10 mg total) by mouth 2 (two) times a day as needed for muscle spasms   escitalopram (LEXAPRO) 10 mg tablet   No No   Sig: Take 1 tablet by mouth daily At 9AM   ketorolac (TORADOL) 10 mg tablet   No No   Sig: Take 1 tablet (10 mg total) by mouth every 6 (six) hours as needed for moderate pain   naproxen (NAPROSYN) 500 mg tablet   No No   Sig: Take 1 tablet (500 mg total) by mouth 2 (two) times a day with meals for 7 days      Facility-Administered Medications: None     No Known Allergies    Previous medical records under review    Review of Systems 13 point ROS negative unless otherwise specified    OBJECTIVE     Patient ID: Lilia Birmingham is a 23 y o  male      Vitals:   Vitals:    07/13/22 1849 07/13/22 2015 22   BP: (!) 181/112 136/79 136/79 138/77   BP Location: Left arm Left arm  Left arm   Pulse:  92  88   Resp:  20  18   Temp:       TempSrc:       SpO2:  97%  97%   Weight:          Body mass index is 55 33 kg/m²  No intake or output data in the 24 hours ending 226    Temperature: Temp (24hrs), Av 2 °F (36 8 °C), Min:98 2 °F (36 8 °C), Max:98 2 °F (36 8 °C)   Temperature: 98 2 °F (36 8 °C)    Invasive Devices: Invasive Devices  Report    Peripheral Intravenous Line  Duration           Peripheral IV 22 Left Antecubital <1 day                Physical Exam Vitals reviewed  Constitutional:    Not in acute distress  Anxious  Elevated BMI  Not ill-appearing, toxic-appearing or diaphoretic  HENT:   Normocephalic and atraumatic  External ear normal b/l  Nose normal  No congestion or rhinorrhea  Mucous membranes are moist   Oropharynx is clear  No oropharyngeal exudate or posterior oropharyngeal erythema  Eyes:    No scleral icterus  No discharge b/l  Conjunctivae normal    Pulmonary:  Pulmonary effort is normal  No respiratory distress  GI: abdomen not distended  Musculoskeletal: no gross deformities  Skin:   Skin is not pale  Psychiatric:       Mood normal  Affect congruent    Neurologic Exam:   Mental Status   Alert and oriented to person, place, and time  Attention is normal  Speech is fluent w/o dysarthria     Cranial Nerves   CN II unable to reliably testy visual fields but blink to threat absent bilaterally, visual acuity significantly impaired R > L   CN III, IV, VI right APD apparent, , no CN III palsy, right CN VI weakness, no clear nystagmus   CN V   Facial sensation symmetric  CN VII  Facial expression full, symmetric  CN VIII Hearing roughly symmetric  CN IX, X Palate symmetric  CN XI trapezius strength symmetric  CN XII no dysarthria, symmetric lingual protrusion       Motor Exam   Muscle bulk and tone grossly normal; Pronator drift absent b/l  Strength intact and symmetric BUE/BLE     Sensory Exam   Light touch, vibration and Temperature intact and symmetric BUE/BLE    Gait, Coordination, and Reflexes   FTN not tested (visual acuity) but HTS intact b/l; no significant tremor observed  Reflexes difficult to obtain but patient w/ elevated BMI and inability to relax 2/2 anxiety  Plantar response equivocal b/l  Gait: deferred    LABORATORY DATA     Labs: I have personally reviewed pertinent reports  Results from last 7 days   Lab Units 07/13/22 1951   WBC Thousand/uL 10 57*   HEMOGLOBIN g/dL 18 2*   HEMATOCRIT % 52 7*   PLATELETS Thousands/uL 178   NEUTROS PCT % 77*   MONOS PCT % 8      Results from last 7 days   Lab Units 07/13/22 1951   POTASSIUM mmol/L 4 2   CHLORIDE mmol/L 105   CO2 mmol/L 26   BUN mg/dL 12   CREATININE mg/dL 0 85   CALCIUM mg/dL 9 8       IMAGING & DIAGNOSTIC TESTING     Radiology Results: I have personally reviewed pertinent reports  and I have personally reviewed pertinent films in PACS  CTA head and neck with and without contrast   Final Result by Celio Valentin MD (07/13 2141)      No stenosis, dissection or occlusion of the carotid or vertebral arteries or major vessels of the [place]  Workstation performed: [id number]         CT head wo contrast   Final Result by Elais Odonnell DO (07/13 2014)      No acute intracranial abnormality  Workstation performed: [id number]         MRI brain and orbits wo and w contrast    (Results Pending)       Other Diagnostic Testing: I have personally reviewed pertinent reports  ACTIVE MEDICATIONS     No current facility-administered medications for this encounter         CODE STATUS & ADVANCED DIRECTIVES     Code Status: Prior  Advance Directive and Living Will:      Power of :    POLST:      VTE Pharmacologic Prophylaxis: hold until after LP  VTE Mechanical Prophylaxis: sequential compression device    ==  Xi Aldana MD  Resident, Neurology, PGY-3  [address]

## 2022-07-15 LAB
ANION GAP SERPL CALCULATED.3IONS-SCNC: 10 MMOL/L (ref 4–13)
BUN SERPL-MCNC: 22 MG/DL (ref 5–25)
CALCIUM SERPL-MCNC: 9.5 MG/DL (ref 8.3–10.1)
CHLORIDE SERPL-SCNC: 109 MMOL/L (ref 100–108)
CO2 SERPL-SCNC: 22 MMOL/L (ref 21–32)
CREAT SERPL-MCNC: 1.07 MG/DL (ref 0.6–1.3)
ENA SS-A AB SER-ACNC: <0.2 AI (ref 0–0.9)
ENA SS-B AB SER-ACNC: <0.2 AI (ref 0–0.9)
ERYTHROCYTE [DISTWIDTH] IN BLOOD BY AUTOMATED COUNT: 12.9 % (ref 11.6–15.1)
GFR SERPL CREATININE-BSD FRML MDRD: 96 ML/MIN/1.73SQ M
GLUCOSE SERPL-MCNC: 97 MG/DL (ref 65–140)
HCT VFR BLD AUTO: 50.9 % (ref 36.5–49.3)
HGB BLD-MCNC: 17.1 G/DL (ref 12–17)
INR PPP: 0.94 (ref 0.84–1.19)
MCH RBC QN AUTO: 30.3 PG (ref 26.8–34.3)
MCHC RBC AUTO-ENTMCNC: 33.6 G/DL (ref 31.4–37.4)
MCV RBC AUTO: 90 FL (ref 82–98)
PLATELET # BLD AUTO: 195 THOUSANDS/UL (ref 149–390)
PMV BLD AUTO: 10.4 FL (ref 8.9–12.7)
POTASSIUM SERPL-SCNC: 3.8 MMOL/L (ref 3.5–5.3)
PROTHROMBIN TIME: 12.2 SECONDS (ref 11.6–14.5)
RBC # BLD AUTO: 5.64 MILLION/UL (ref 3.88–5.62)
RYE IGE QN: NEGATIVE
SCAN RESULT: NORMAL
SODIUM SERPL-SCNC: 141 MMOL/L (ref 136–145)
WBC # BLD AUTO: 10.56 THOUSAND/UL (ref 4.31–10.16)

## 2022-07-15 PROCEDURE — 85027 COMPLETE CBC AUTOMATED: CPT | Performed by: PHYSICIAN ASSISTANT

## 2022-07-15 PROCEDURE — 97166 OT EVAL MOD COMPLEX 45 MIN: CPT

## 2022-07-15 PROCEDURE — 80048 BASIC METABOLIC PNL TOTAL CA: CPT | Performed by: PHYSICIAN ASSISTANT

## 2022-07-15 PROCEDURE — 85610 PROTHROMBIN TIME: CPT | Performed by: PSYCHIATRY & NEUROLOGY

## 2022-07-15 PROCEDURE — 97535 SELF CARE MNGMENT TRAINING: CPT

## 2022-07-15 PROCEDURE — 99232 SBSQ HOSP IP/OBS MODERATE 35: CPT | Performed by: STUDENT IN AN ORGANIZED HEALTH CARE EDUCATION/TRAINING PROGRAM

## 2022-07-15 PROCEDURE — 99232 SBSQ HOSP IP/OBS MODERATE 35: CPT | Performed by: PSYCHIATRY & NEUROLOGY

## 2022-07-15 PROCEDURE — 97162 PT EVAL MOD COMPLEX 30 MIN: CPT

## 2022-07-15 RX ORDER — CLONAZEPAM 0.5 MG/1
0.5 TABLET ORAL
Status: DISCONTINUED | OUTPATIENT
Start: 2022-07-15 | End: 2022-07-15

## 2022-07-15 RX ORDER — CLONAZEPAM 0.5 MG/1
0.5 TABLET ORAL
Status: DISCONTINUED | OUTPATIENT
Start: 2022-07-15 | End: 2022-07-17

## 2022-07-15 RX ORDER — LANOLIN ALCOHOL/MO/W.PET/CERES
3 CREAM (GRAM) TOPICAL
Status: DISCONTINUED | OUTPATIENT
Start: 2022-07-15 | End: 2022-07-20 | Stop reason: HOSPADM

## 2022-07-15 RX ADMIN — MELATONIN 3 MG: at 21:44

## 2022-07-15 RX ADMIN — CLONAZEPAM 0.5 MG: 0.5 TABLET ORAL at 21:44

## 2022-07-15 RX ADMIN — ACETAZOLAMIDE 500 MG: 250 TABLET ORAL at 21:44

## 2022-07-15 RX ADMIN — ESCITALOPRAM OXALATE 10 MG: 10 TABLET ORAL at 09:28

## 2022-07-15 RX ADMIN — ACETAZOLAMIDE 500 MG: 250 TABLET ORAL at 09:28

## 2022-07-15 NOTE — PROGRESS NOTES
[facility]  Progress Note - Diomedes Punch 2000, 23 y o  male MRN: [id number]  Unit/Bed#: [facility] 706-01 Encounter: [id number]  Primary Care Provider: Brittney Hernandez MD   Date and time admitted to hospital: 7/13/2022  6:44 PM    BMI 50 0-59 9, adult [facility])  Assessment & Plan  · Dietary consult  · Discussed with patient that would be beneficial to establish care with primary care doctor, he has one in our system however this does not remember meeting this person    Severe episode of recurrent major depressive disorder, without psychotic features ([facility] [address] )  Assessment & Plan  · Continue Lexapro    * Binocular vision loss  Assessment & Plan  · At baseline 3 weeks ago patient reports his right eye had 25% visibility (since he was 21years old) and his left eye was normal   · Noticed decreased vision after going to gym  Also w/ left sided neck pain  · Has right apparent pupillary defect on exam  · CTH/CTA negative in the ER  · admit to medicine on telemetry  · Consult Neurology, initial concern for demylenation process, now more concerning for idiopathic intracranial hypertension  · MRI w/ evidence of intracranial hypertension, lumbar puncture 7/14 with opening pressure 55, started on acetazolamide  · Per Neurology, repeat LP with opening pressures today  · Hold on DVT PPX for now   · CVA protocol with neuro checks  · Follow-up extensive labs             VTE Pharmacologic Prophylaxis: VTE Score: 3 holding AC given plan for repeat LP today    Patient Centered Rounds: I performed bedside rounds with nursing staff today  Discussions with Specialists or Other Care Team Provider: neuro    Education and Discussions with Family / Patient: Attempted to update  (cousin) via phone  Left voicemail  Time Spent for Care: 30 minutes  More than 50% of total time spent on counseling and coordination of care as described above      Current Length of Stay: 2 day(s)  Current Patient Status: Inpatient   Certification Statement: The patient will continue to require additional inpatient hospital stay due to elevated intracranical pressure requiring repeat LPs  Discharge Plan: Anticipate discharge in 24-48 hrs to home  Code Status: Level 1 - Full Code    Subjective:   Feels well, nervous about repeat LP  No further headaches  Vision continues to improve but remains off of baseline  Discussed plan, possible dx of IIH, mediation reasoning  Objective:     Vitals:   Temp (24hrs), Av 5 °F (36 9 °C), Min:98 1 °F (36 7 °C), Max:98 9 °F (37 2 °C)    Temp:  [98 1 °F (36 7 °C)-98 9 °F (37 2 °C)] 98 1 °F (36 7 °C)  HR:  [] 71  Resp:  [16-20] 16  BP: (131-157)/(71-84) 131/75  SpO2:  [94 %-97 %] 96 %  Body mass index is 55 33 kg/m²  Input and Output Summary (last 24 hours):   No intake or output data in the 24 hours ending 07/15/22 1105    Physical Exam:   Physical Exam  Vitals reviewed  Constitutional:       General: He is not in acute distress  Appearance: He is obese  He is not ill-appearing  HENT:      Nose: Nose normal       Mouth/Throat:      Mouth: Mucous membranes are moist    Eyes:      Comments: Bilateral pupils dilated abut 5mm, deferred further exam 2/2 pt comfort and as neuro service already examined this AM   Cardiovascular:      Rate and Rhythm: Normal rate and regular rhythm  Heart sounds: Normal heart sounds  Pulmonary:      Effort: Pulmonary effort is normal  No respiratory distress  Breath sounds: Normal breath sounds  No wheezing  Abdominal:      General: Abdomen is flat  Bowel sounds are normal  There is no distension  Palpations: Abdomen is soft  Tenderness: There is no abdominal tenderness  Musculoskeletal:         General: Normal range of motion  Cervical back: Normal range of motion  Skin:     General: Skin is warm and dry  Neurological:      Mental Status: He is alert        Comments: See neurology note for detailed exam of cranial nerves, no gross defects in UE/LEs    Psychiatric:         Mood and Affect: Mood normal           Additional Data:     Labs:  Results from last 7 days   Lab Units 07/15/22  0556 07/14/22  0609   WBC Thousand/uL 10 56* 9 35   HEMOGLOBIN g/dL 17 1* 16 5   HEMATOCRIT % 50 9* 49 2   PLATELETS Thousands/uL 195 162   NEUTROS PCT %  --  70   LYMPHS PCT %  --  17   MONOS PCT %  --  10   EOS PCT %  --  1     Results from last 7 days   Lab Units 07/15/22  0556 07/14/22  0609   SODIUM mmol/L 141 139   POTASSIUM mmol/L 3 8 3 8   CHLORIDE mmol/L 109* 106   CO2 mmol/L 22 27   BUN mg/dL 22 11   CREATININE mg/dL 1 07 0 81   ANION GAP mmol/L 10 6   CALCIUM mg/dL 9 5 9 5   ALBUMIN g/dL  --  3 3*   TOTAL BILIRUBIN mg/dL  --  0 75   ALK PHOS U/L  --  60   ALT U/L  --  44   AST U/L  --  22   GLUCOSE RANDOM mg/dL 97 89     Results from last 7 days   Lab Units 07/15/22  0556   INR  0 94         Results from last 7 days   Lab Units 07/14/22  0609   HEMOGLOBIN A1C % 5 4           Lines/Drains:  Invasive Devices  Report    Peripheral Intravenous Line  Duration           Peripheral IV 07/13/22 Left Antecubital 1 day                      Imaging: Reviewed radiology reports from this admission including: MRI brain    Recent Cultures (last 7 days):   Results from last 7 days   Lab Units 07/14/22  1031   GRAM STAIN RESULT  No No Polys or Bacteria seen       Last 24 Hours Medication List:   Current Facility-Administered Medications   Medication Dose Route Frequency Provider Last Rate    acetaZOLAMIDE  500 mg Oral Q12H [address] David Lerma MD      cyclobenzaprine  10 mg Oral BID PRN Pedro Funez DO      escitalopram  10 mg Oral Daily Pedro Funez DO          Today, Patient Was Seen By: Bruce Solis MD    **Please Note: This note may have been constructed using a voice recognition system  **

## 2022-07-15 NOTE — OCCUPATIONAL THERAPY NOTE
Occupational Therapy Evaluation     Patient Name: Scotty Osgood  Today's Date: 7/15/2022  Problem List  Principal Problem:    Binocular vision loss  Active Problems:    Severe episode of recurrent major depressive disorder, without psychotic features ([facility] [address] )    BMI 50 0-59 9, adult (formerly [facility])    Decreased visual acuity    Acute nonintractable headache    Past Medical History  Past Medical History:   Diagnosis Date    Depression     Psychiatric illness     Self-injurious behavior     scratches on L forearm, started 2 days ago, denies previous self-injury     Past Surgical History  Past Surgical History:   Procedure Laterality Date    FL LUMBAR PUNCTURE DIAGNOSTIC  7/14/2022      07/15/22 0820   OT Last Visit   OT Visit Date 07/15/22   Note Type   Note type Evaluation   Restrictions/Precautions   Weight Bearing Precautions Per Order No   Other Precautions Telemetry; Fall Risk;Visual impairment   Pain Assessment   Pain Assessment Tool 0-10   Pain Score No Pain   Home Living   Type of [address] [place] [address] Two level;1/2 bath on main [facility] with 0STE, with full flight to 2nd floor bedroom)   Bathroom Shower/Tub Tub/shower unit   Bathroom Toilet Standard   Bathroom Equipment   (no DME at baseline)   [address]   (NO AD at baseline)   Prior Function   Level of [place] Independent with ADLs and functional mobility   Lives With Family  (brother, cousin)   Receives Help From Family  (cousin, mother)   ADL Assistance Independent   IADLs Needs assistance   Falls in the last 6 months 0   Lifestyle   Autonomy I with ADL's, shares homemaking tasks with family, (-) drives,family assists  no AD with functional mobility   Reciprocal Relationships mother, cousin, brother   Service to Others Employed-works at [employer]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   Intrinsic Gratification listening to music, singing   Psychosocial   Psychosocial (WDL) WDL   ADL   Eating Assistance 5  Supervision/Setup   Grooming Assistance 5  Supervision/Setup   UB Bathing Assistance 5  Supervision/Setup   LB Bathing Assistance 5  Supervision/Setup   UB Dressing Assistance 5  Supervision/Setup   LB Dressing Assistance 5  Supervision/Setup   Toileting Assistance  5  Supervision/Setup   Bed Mobility   Additional Comments pt left seated on bed with all needs met   Transfers   Sit to Stand 5  Supervision   Additional items Assist x 1   Stand to Sit 5  Supervision   Additional items Assist x 1   Additional Comments no AD with functional transfers   Functional Mobility   Functional Mobility 5  Supervision   Additional Comments S without AD household distance functional mobility with verbal cues for visual impairment to negotiate obstacles in hallway, bedroom and use of increased senses to mobilize safely, locate items in room  See treatment note for further education on low vision strategies  Additional items (no AD)   Balance   Static Sitting Normal   Dynamic Sitting Good   Static Standing Fair +   Dynamic Standing Fair   Ambulatory Fair   Activity Tolerance   Activity Tolerance Patient tolerated treatment well   Medical Staff Made Aware PT sukhi, PT student Alvaro Trotter due to the patient's co-morbidities, clinically unstable presentation, and present impairments which are a regression from the patient's baseline  Nurse Made Aware RN cleared pt for therapy   RUE Assessment   RUE Assessment WFL   LUE Assessment   LUE Assessment WFL   Hand Function   Gross Motor Coordination Functional   Fine Motor Coordination Functional   Sensation   Light Touch No apparent deficits   Vision-Basic Assessment   Visual History Other (Comment)  (visual impairment (recent) right>left)   Vision - Complex Assessment   Head Position WDL   Acuity (able to see outline of people, colors (blue not pink) see TV shape and screen with shades drawn for glare reduction)   Visual Screen Results Decreased visual acuity; Visual processing deficits   Additional Comments Opthamologist consulted   Cognition   Overall Cognitive Status WFL   Arousal/Participation Alert; Responsive; Cooperative   Attention Within functional limits   Orientation Level Oriented X4   Memory Within functional limits   Following Commands Follows all commands and directions without difficulty   Comments pt pleasant and motivated for therapy, able to follow directives, carryover new learning, good insight into deficits   Assessment   Limitation Visual deficit; Decreased high-level ADLs; Decreased ADL status   Prognosis Good   Assessment Pt is a 23 y o  male who was admitted to [facility] on 7/13/2022 with Binocular vision loss   Pt's problem list also includes PMH of The patient's raw score on the AM-PAC Daily Activity inpatient short form is 19, standardized score is 40 22, greater than 39 4  Patients at this level are likely to benefit from discharge to home  Please refer to the recommendation of the Occupational Therapist for safe discharge planning    At baseline pt was completing I with ADL's/IaDL's, no AD with functional ambulation  Pt lives with brother, cousin in a [address] with 0STE and FFOS to 2nd bedroom, full bathroom, 1/2 bathroom on first  Currently pt requires S/set-up for visual impairment for overall ADLS and S without AD no overt LOB, cues for negotiating obstacles in hallway 2* to visual impairment  for functional mobility/transfers  Pt currently presents with impairments in the following categories -visual impairment safety    These impairments, as well as pt's fatigue, decreased caregiver support and risk for falls  limit pt's ability to safely engage in all baseline areas of occupation, includingeating, grooming, bathing, dressing, toileting, functional mobility/transfers, community mobility, laundry , house maintenance, medication management, meal prep, cleaning, work/volunteer work , social participation  and leisure activities   The patient's raw score on the AM-PAC Daily Activity inpatient short form is 19, standardized score is 40 22, greater than 39 4  Patients at this level are likely to benefit from discharge to home  Please refer to the recommendation of the Occupational Therapist for safe discharge planning  From OT standpoint, recommend no needs upon D/C  OT will continue to follow to address the below stated goals  Goals   Patient Goals to see better   STG Time Frame 1-3   Short Term Goal #1 pt/caregiver particate in visual impairment edcucation with good carryover of new learning  Short Term Goal #2 pt to particiate in home safety/fall preventinon techniques iwht comprehension to increase safety/I with discharge  Plan   Treatment Interventions ADL retraining;Visual perceptual retraining;Patient/family training;Equipment evaluation/education; Compensatory technique education; Energy conservation; Activityengagement;Continued evaluation   Goal Expiration Date 07/18/22   OT Treatment Day 1   OT Frequency 3-5x/wk   Additional Treatment Session   Start Time 0930   End Time 0955   Treatment Assessment Pt seen for an additional OT treatment session with focus on low vision compensatory strategies with written handout including self feeding tray with clock arrangement, organizational strategies for placing items to easily locate/determine differences using color coding/taping for increased depth perception (steps, clothes, kitchen items) , using senses with mobility/obstacle negotiation including UE touch in front of body  Pt with good comprehension to above education, pt reports my mom can read handout  To assist as needed  pt declined shower chair -provided safe tub transfer method and home safety/fall prevention strategies   NO Skilled OT needs, Anticipate d/c home with family support when medically cleared - d/c from caseload   Recommendation   OT Discharge Recommendation No rehabilitation needs   Equipment Recommended   (No DME needs at baseline)   AM-PAC Daily Activity Inpatient Lower Body Dressing 3   Bathing 3   Toileting 3   Upper Body Dressing 3   Grooming 4   Eating 3   Daily Activity Raw Score 19   Daily Activity Standardized Score (Calc for Raw Score >=11) 40 22   AM-PAC Applied Cognition Inpatient   Following a Speech/Presentation 4   Understanding Ordinary Conversation 4   Taking Medications 4   Remembering Where Things Are Placed or Put Away 4   Remembering List of 4-5 Errands 4   Taking Care of Complicated Tasks 4   Applied Cognition Raw Score 24   Applied Cognition Standardized Score 62 21      Divine Mayer MOT, OTR/L

## 2022-07-15 NOTE — ASSESSMENT & PLAN NOTE
· At baseline 3 weeks ago patient reports his right eye had 25% visibility (since he was 21years old) and his left eye was normal   · Noticed decreased vision after going to gym  Also w/ left sided neck pain  · Has right apparent pupillary defect on exam  · CTH/CTA negative in the ER  · admit to medicine on telemetry  · Consult Neurology, initial concern for demylenation process, now more concerning for idiopathic intracranial hypertension  · MRI w/ evidence of intracranial hypertension, lumbar puncture 7/14 with opening pressure 55, started on acetazolamide  · Per Neurology, repeat LP with opening pressures today  · Hold on DVT PPX for now   · CVA protocol with neuro checks     · Follow-up extensive labs

## 2022-07-15 NOTE — PROGRESS NOTES
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  Vision is improving today  Con't Diamox 500mg BID  Plan repeat LP on Monday  If symptoms get worse during weekend, will do STAT LP  Con't to hold DVT ppx  * Binocular vision loss  Assessment & Plan   Tapan Louise is a 23 y o  male w/ limited PMH on no medications who p/w binocular vision loss for approximately 4-5 days preceded by left shoulder pain that radiated up the lateral neck and posterior skull to apex of head that started after working out  Visual deficit started as blurring w/ a feeling of eye irritation but no overt pain   Overall labs thus far unremarkable     Workup:   o CTH/CTA: negative for acute blood, signs of ischemia, LVO or critical stenosis and no vertebral defect concerning for dissection  o MRI brain and orbit w/wo results: showed evidence of intracranial HTN with no evidence of demyelinating disease  o MRV brain and orbit: significant stenosis of bilateral dural venous sinuses with no evidence of venous thrombosis  o Labs: ESR: 25, CRP 24 7  o CSF labs: lactic, protein, glucose normal, Meningitis labs normal, nothing found on gram stain of CSF  o 07/14 S/p IR LP opening pressure: 55     Exam 7/15 w/ R > L visual loss progressively improving with periods of almost complete resolution, mild right APD, right defect in abduction has resolved since yesterday with EOM normal :difficulty in obtaining reflexes but patient's strength and sensation intact and he is 405 lbs and unable to easily relax due to anxiety)  Plan:   Initially planned repeat IR LP today however, IR thinks it might be too soon and recommends to do it on    If patient notices worsening symptoms, STAT LP during weekend  IR is aware of it   Continue acetazolamide 500mg BID   Continue to hold DVT ppx in case STAT LP is needed   Sequential compression device for DVT ppx   Stat CTH for acute decompensation   Ophthalmology consulted and appreciated recommendation   Other medical management per primary team   Neurology will continue to follow      SUBJECTIVE     Patient was seen and examined  No acute events overnight  Symptoms progressively improving and will f/u post therapeutic LP this pm     Pertinent Negatives include: headaches, syncope, seizures     Review of Systems   Constitutional: Negative for fever  Eyes: Negative for pain  Respiratory: Negative for chest tightness and shortness of breath  Cardiovascular: Negative for chest pain  Gastrointestinal: Negative for abdominal pain, diarrhea, nausea and vomiting  Genitourinary: Negative for dysuria  Musculoskeletal: Negative for arthralgias  Skin: Negative for color change  Neurological: Negative for dizziness, seizures and headaches  OBJECTIVE     Patient ID: Job Ramachandran is a 23 y o  male  Vitals:    22 1606 22 2036 22 2140 07/15/22 0723   BP: 157/71 132/84 136/80 131/75   BP Location: Right arm      Pulse: 94 104 (!) 109 71   Resp: 19 18 20 16   Temp:   98 9 °F (37 2 °C) 98 1 °F (36 7 °C)   TempSrc:       SpO2: 97% 95% 94% 96%   Weight:          Temperature:   Temp (24hrs), Av 5 °F (36 9 °C), Min:98 1 °F (36 7 °C), Max:98 9 °F (37 2 °C)    Temperature: 98 1 °F (36 7 °C)      Physical Exam  Vitals and nursing note reviewed     Constitutional: Appearance: He is well-developed  He is obese  HENT:      Head: Normocephalic and atraumatic  Nose: Nose normal    Eyes:      Extraocular Movements: EOM normal       Conjunctiva/sclera: Conjunctivae normal    Cardiovascular:      Rate and Rhythm: Normal rate  Pulmonary:      Effort: Pulmonary effort is normal  No respiratory distress  Abdominal:      Palpations: Abdomen is soft  Tenderness: There is no abdominal tenderness  Musculoskeletal:      Cervical back: Neck supple  Skin:     General: Skin is warm and dry  Neurological:      Mental Status: He is alert and oriented to person, place, and time  Coordination: Finger-Nose-Finger Test normal       Gait: Gait is intact  Deep Tendon Reflexes:      Reflex Scores:       Tricep reflexes are 2+ on the right side and 2+ on the left side  Bicep reflexes are 2+ on the right side and 2+ on the left side  Brachioradialis reflexes are 2+ on the right side and 2+ on the left side  Patellar reflexes are 2+ on the right side and 2+ on the left side  Achilles reflexes are 2+ on the right side and 2+ on the left side  Neurologic Exam     Mental Status   Oriented to person, place, and time  Level of consciousness: alert  Knowledge: good  Cranial Nerves     CN II   Visual acuity: decreased  Right visual field deficit: none  Left visual field deficit: none     CN III, IV, VI   Extraocular motions are normal    Right pupil: Shape: regular  Reactivity: sluggish  Consensual response: intact  Left pupil: Shape: regular  Reactivity: brisk  Consensual response: intact  CN V   Facial sensation intact  CN VII   Facial expression full, symmetric       CN VIII   CN VIII normal      CN IX, X   CN IX normal    CN X normal      CN XI   CN XI normal      CN XII   CN XII normal      Motor Exam   Muscle bulk: normal  Overall muscle tone: normal    Strength   Right neck flexion: 5/5  Left neck flexion: 5/5  Right neck extension: 55  Left neck extension: 5  Right deltoid: 55  Left deltoid:   Right biceps: 5  Left biceps:   Right triceps:   Left triceps:   Right wrist flexion:   Left wrist flexion:   Right wrist extension:   Left wrist extension:   Right interossei:   Left interossei:   Right abdominals:   Left abdominals:   Right iliopsoas:   Left iliopsoas:   Right quadriceps: 5  Left quadriceps:   Right hamstrin/5  Left hamstrin/5  Right glutei:   Left glutei:   Right anterior tibial:   Left anterior tibial:   Right posterior tibial:   Left posterior tibial:   Right peroneal:   Left peroneal:   Right gastroc:   Left gastroc:     Sensory Exam   Light touch normal      Gait, Coordination, and Reflexes     Gait  Gait: normal    Coordination   Finger to nose coordination: normal    Reflexes   Right brachioradialis: 2+  Left brachioradialis: 2+  Right biceps: 2+  Left biceps: 2+  Right triceps: 2+  Left triceps: 2+  Right patellar: 2+  Left patellar: 2+  Right achilles: 2+  Left achilles: 2+  Right : 2+  Left : 2+       LABORATORY DATA     Labs: I have personally reviewed pertinent reports      Results from last 7 days   Lab Units 07/15/22  0556 07/14/22  0609 07/13/22  1951   WBC Thousand/uL 10 56* 9 35 10 57*   HEMOGLOBIN g/dL 17 1* 16 5 18 2*   HEMATOCRIT % 50 9* 49 2 52 7*   PLATELETS Thousands/uL 195 162 178   NEUTROS PCT %  --  70 77*   MONOS PCT %  --  10 8      Results from last 7 days   Lab Units 07/15/22  0556 22   SODIUM mmol/L 141 139 137   POTASSIUM mmol/L 3 8 3 8 4 2   CHLORIDE mmol/L 109* 106 105   CO2 mmol/L 22 27 26   BUN mg/dL 22 11 12   CREATININE mg/dL 1 07 0 81 0 85   CALCIUM mg/dL 9 5 9 5 9 8   ALK PHOS U/L  --  60  --    ALT U/L  --  44  --    AST U/L  --  22  --      Results from last 7 days   Lab Units 22  0609   MAGNESIUM mg/dL 1 8          Results from last 7 days   Lab Units 07/15/22  0556 07/14/22  0609   INR  0 94 1 00   PTT seconds  --  27     IMAGING & DIAGNOSTIC TESTING     Radiology Results: I have personally reviewed pertinent reports  MRV head wo contrast   Final Result by Tammy العراقي MD (07/15 0901)      No evidence of dural venous sinus thrombosis  Severe stenosis in bilateral transverse-sigmoid dural venous sinus junction  Workstation performed: [id number]         [facility] [place]-patient lumbar puncture   Final Result by Adair Pizano MD (07/14 1326)      Successful fluoroscopically guided lumbar puncture with an opening pressure of 55 cm H2O  Approximately 25 mL's of clear colorless CSF was removed and sent to lab for requested analysis  Closing pressure was 10 cm H2O  I reviewed the above findings and procedure with Dr Nino Talbert  PERFORMED, DICTATED AND SIGNED BY: Pat Morales PA-C      Workstation performed: [id number]         MRI brain and orbits wo and w contrast   Final Result by Joe Cortez MD (07/14 8929)      1  MRI evidences suggesting intracranial hypertension  Recommend lumbar puncture for further evaluation/confirmation  2   No evidence of optic neuritis  3   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             Workstation performed: [id number]         CTA head and neck with and without contrast   Final Result by Nestor Taveras MD (07/13 2141)      No stenosis, dissection or occlusion of the carotid or vertebral arteries or major vessels of the [place]  Workstation performed: [id number]         CT head wo contrast   Final Result by Giulia Torres DO (07/13 2014)      No acute intracranial abnormality  Workstation performed: [id number]         FL lumbar puncture diagnostic    (Results Pending)       Other Diagnostic Testing: I have personally reviewed pertinent reports        ACTIVE MEDICATIONS     Current Facility-Administered Medications   Medication Dose Route Frequency    acetaZOLAMIDE (DIAMOX) tablet 500 mg  500 mg Oral Q12H [address]    cyclobenzaprine (FLEXERIL) tablet 10 mg  10 mg Oral BID PRN    escitalopram (LEXAPRO) tablet 10 mg  10 mg Oral Daily       Prior to Admission medications    Medication Sig Start Date End Date Taking?  Authorizing Provider   cyclobenzaprine (FLEXERIL) 10 mg tablet Take 1 tablet (10 mg total) by mouth 2 (two) times a day as needed for muscle spasms 7/7/22   Lylazaro Wells PA-C   escitalopram (LEXAPRO) 10 mg tablet Take 1 tablet by mouth daily At [facility] 6/2/17   Gian Frederick   ketorolac (TORADOL) 10 mg tablet Take 1 tablet (10 mg total) by mouth every 6 (six) hours as needed for moderate pain 7/7/22   Jenn Wells PA-C   naproxen (NAPROSYN) 500 mg tablet Take 1 tablet (500 mg total) by mouth 2 (two) times a day with meals for 7 days 7/29/20 8/5/20  Kash Kwok PA-C       VTE Pharmacologic Prophylaxis: Pharmacologic VTE Prophylaxis contraindicated due to LP  VTE Mechanical Prophylaxis: sequential compression device    ==  MD Shana Birch [facility], PGY-2

## 2022-07-15 NOTE — PLAN OF CARE
Problem: OCCUPATIONAL THERAPY ADULT  Goal: Performs self-care activities at highest level of function for planned discharge setting  See evaluation for individualized goals  Description: Treatment Interventions: ADL retraining, Visual perceptual retraining, Patient/family training, Equipment evaluation/education, Compensatory technique education, Energy conservation, Activityengagement, Continued evaluation  Equipment Recommended:  (No DME needs at baseline)       See flowsheet documentation for full assessment, interventions and recommendations  Note: Limitation: Visual deficit, Decreased high-level ADLs, Decreased ADL status  Prognosis: Good  Assessment: Pt is a 23 y o  male who was [facility] on 7/13/2022 with Binocular vision loss   Pt's problem list also includes PMH of The patient's raw score on the AM-PAC Daily Activity inpatient short form is 19, standardized score is 40 22, greater than 39 4  Patients at this level are likely to benefit from discharge to home  Please refer to the recommendation of the Occupational Therapist for safe discharge planning    At baseline pt was completing I with ADL's/IaDL's, no AD with functional ambulation  Pt lives with brother, cousin in a [address] with 0STE and FFOS to 2nd bedroom, full bathroom, 1/2 bathroom on first  Currently pt requires S/set-up for visual impairment for overall ADLS and S without AD no overt LOB, cues for negotiating obstacles in hallway 2* to visual impairment  for functional mobility/transfers  Pt currently presents with impairments in the following categories -visual impairment safety    These impairments, as well as pt's fatigue, decreased caregiver support and risk for falls  limit pt's ability to safely engage in all baseline areas of occupation, includingeating, grooming, bathing, dressing, toileting, functional mobility/transfers, community mobility, laundry , house maintenance, medication management, meal prep, cleaning, work/volunteer work , social participation  and leisure activities   The patient's raw score on the AM-PAC Daily Activity inpatient short form is 19, standardized score is 40 22, greater than 39 4  Patients at this level are likely to benefit from discharge to home  Please refer to the recommendation of the Occupational Therapist for safe discharge planning  From OT standpoint, recommend no needs upon D/C  OT will continue to follow to address the below stated goals       OT Discharge Recommendation: No rehabilitation needs

## 2022-07-15 NOTE — PHYSICAL THERAPY NOTE
Physical Therapy Evaluation    Patient Name: Paty Zhao    Today's Date: 7/15/2022     Problem List  Principal Problem:    Binocular vision loss  Active Problems:    Severe episode of recurrent major depressive disorder, without psychotic features ([facility] [address] )    BMI 50 0-59 9, adult ([facility])    Decreased visual acuity    Acute nonintractable headache       Past Medical History  Past Medical History:   Diagnosis Date    Depression     Psychiatric illness     Self-injurious behavior     scratches on L forearm, started 2 days ago, denies previous self-injury        Past Surgical History  Past Surgical History:   Procedure Laterality Date    FL LUMBAR PUNCTURE DIAGNOSTIC  7/14/2022           07/15/22 0828   PT Last Visit   PT Visit Date 07/15/22   Note Type   Note type Evaluation   Pain Assessment   Pain Assessment Tool 0-10   Pain Score No Pain   Restrictions/Precautions   Weight Bearing Precautions Per Order No   Other Precautions Visual impairment; Fall Risk   Home Living   Type of [address] [place] [address] Two level;Stairs to enter with rails;Bed/bath upstairs;1/2 bath on main level  (0 MONIKA, full flight to 2nd floor bed/bath)   Additional Comments denies any DME or AD use PTA   Prior Function   Level of [place] Independent with ADLs and functional mobility   Lives With Family  (brother and cousin)   Receives Help From Family   ADL Assistance Independent   IADLs Needs assistance   Falls in the last 6 months 0   Comments family able to assist as needed   General   Family/Caregiver Present No   Cognition   Overall Cognitive Status WFL   Arousal/Participation Cooperative   Orientation Level Oriented X4   Memory Within functional limits   Following Commands Follows all commands and directions without difficulty   Comments pleasant to work with   RLE Assessment   RLE Assessment WFL   LLE Assessment   LLE Assessment WFL   Light Touch   RLE Light Touch Grossly intact   LLE Light Touch Grossly intact   Bed Mobility   Supine to Sit Unable to assess   Sit to Supine Unable to assess   Additional Comments pt presented standing in room upon entry and left standing in bathroom upon conclusion   Transfers   Sit to Stand 5  Supervision   Stand to Sit 5  Supervision   Stand pivot 5  Supervision   Additional Comments no AD   Ambulation/Elevation   Gait pattern Improper Weight shift;Decreased foot clearance; Short stride; Excessively slow  (2* poor vision)   Gait Assistance 5  Supervision   Assistive Device None   Distance 100'   Stair Management Assistance 5  Supervision   Additional items Verbal cues   Stair Management Technique One rail R;Alternating pattern; Foreward;Reciprocal   Number of Stairs 7   Ambulation/Elevation Additional Comments no LOB noted  VC for navigating around obstacles 2* visual impairment   Balance   Static Sitting Normal   Dynamic Sitting Good   Static Standing Fair +   Dynamic Standing Fair   Ambulatory Fair   Endurance Deficit   Endurance Deficit No   Activity Tolerance   Activity Tolerance Patient tolerated treatment well   Medical Staff Made Aware co-eval with OT, Ashleigh 2* medical complexity   Nurse Made Aware pt cleared to see and mobilize per nursing   Assessment   Prognosis Good   Problem List Impaired vision   Assessment Pt is a 23 y o  male admitted to [facility] on 7/13/2022 with L shoulder and neck pain for 2 weeks as well as 4-5 days of "darkening in his vision and trouble seeing"  Pt received a primary medical dx of binocular vision loss  Concern for idiopathic intracranial HTN  Pt has the following comorbidities which affect their treatment: ho obesity, depression, psychiatric illness, self-injurious behavior  as well as personal factors including stairs to access home   Pt has a moderate complexity clinical presentation due to Ongoing medical management for primary dx, Increased reliance on more restrictive AD compared to baseline, Fall risk, Increased assistance needed from caregiver at current time, Trending lab values, Diagnostic imaging pending, Continuous pulse oximetry monitoring  , and PMH  PT was consulted to evaluate pt's functional mobility and discharge needs  Upon evaluation, patient required S for all mobility as outlined above  VC for safety and obstacle avoidance 2* visual impairment  Pt's functional impairments include: impaired vision  At conclusion of eval, pt remained standing in bathroom upon conclusion  The patient's AM-PAC Basic Mobility Inpatient Short Form Raw Score is 20  A Raw score of greater than 16 suggests the patient may benefit from discharge to home  Please also refer to the recommendation of the Physical Therapist for safe discharge planning  At this time, pt has no further acute care PT needs 2* being S level for all mobility and having a supportive family who can assist as needed  Pt denies any concerns regarding their functional mobility or ability to return home safely at this time  Recommend pt continues to mobilize with nursing and restorative staff during hospital stay  Please consult with any changes in mobility status  D/C recommendations are home with increased support of family  Pt able to see color during session and educated on placing colored strips at edge of stair for safety at home  Also educated on using sensation in feet to ensure proper foot placement on stairs and in hands to feel for environment around him when ambulating in new environment  Would benefit from "white cane" for ambulation to avoid obstacles     Barriers to Discharge None   Goals   Patient Goals to improve vision   PT Treatment Day 0   Plan   PT Frequency Other (Comment)  (d/c acute care PT)   Recommendation   PT Discharge Recommendation No rehabilitation needs  (home with support of family)   Equipment Recommended   (recommend pt obtain a "white cane" for visual impairment)   Additional Comments denies any concerns regarding his current functional mobility or ability to return home safely at this time   Luz Elena 8 in Bed Without Bedrails 4   Lying on Back to Sitting on Edge of Flat Bed 4   Moving Bed to Chair 3   Standing Up From Chair 3   Walk in Room 3   Climb 3-5 Stairs 3   Basic Mobility Inpatient Raw Score 20   Basic Mobility Standardized Score 43 99   Highest Level Of Mobility   -HLM Goal 6: Walk 10 steps or more   JH-HLM Achieved 7: Walk 25 feet or more   Modified Leticia Scale   Modified [place] Scale 2   Steven Khan, PT, DPT

## 2022-07-15 NOTE — CASE MANAGEMENT
Case Management Assessment & Discharge Planning Note    Patient name Ramon Lopez  Location [address] 706/[facility]HP 541-66 MRN [id number]  : 2000 Date 7/15/2022       Current Admission Date: 2022  Current Admission Diagnosis:Binocular vision loss   Patient Active Problem List    Diagnosis Date Noted    Binocular vision loss 2022    Severe episode of recurrent major depressive disorder, without psychotic features ([facility] ) 2017    BMI 50 0-59 9, adult ([facility] ) 2017      LOS (days): 2  Geometric Mean LOS (GMLOS) (days):   Days to GMLOS:     OBJECTIVE:    Risk of Unplanned Readmission Score: 4 86         Current admission status: Inpatient       Preferred Pharmacy:   [address],[facility] [address]  Phone: [phone] Fax: [phone]    [facility]/pharmacy #[id number] [address]  Phone: [phone] Fax: [phone]    Primary Care Provider: Guy Richardson MD    Primary Insurance: [facility]  Secondary Insurance:     ASSESSMENT:  Raine Carrizales Proxies    There are no active Health Care Proxies on file  Readmission Root Cause  30 Day Readmission: No    Patient Information  Admitted from[de-identified] Home  Mental Status: Alert       DISCHARGE DETAILS:        Additional Comments:     CM performed chart review and is aware the pt is admitted under the neurology service  At this time the pt does not require CM based interventions  It is anticipated that once cleared for discharge, the pt will transition home without service referrals  CM remains available to assist with post acute care planning as needed

## 2022-07-15 NOTE — CONSULTS
This 20-year-old gentleman with the history of depression presents complaining of loss of vision in both eyes 5 days ago  He was admitted for evaluation  An MRI showed no evidence of demyelinating disease  A lumbar puncture had an opening pressure of 55  The patient was treated with Diamox and had improvement in his vision per neurology  He was noted to have an afferent pupil defect at that time  Past ocular history is unremarkable  He has had no prior eye examinations  Weight is 405 lb, BMI 54  On examination, visual acuity without correction at near is hand motion both eyes  Pupils are equal round reactive to light  There is a 3+ relative afferent pupil defect in the right eye  Extraocular movements appeared unrestricted to me  The external examination was unremarkable  Intra-ocular pressures were 16 both eyes  Nondilated fundoscopy reveals clear media both eyes  The optic nerves appear pale with no visible cup in both eyes  I do not appreciate papilledema at all  The macula, vessels and periphery are unremarkable both eyes  Impression:  Acute bilateral optic neuropathy  Findings could be explained by longstanding untreated idiopathic intracranial hypertension  If the workup and treatment are diagnostically unrewarding, consideration of transfer to [facility] or [facility] could be given  Plan:  Observation  Please re-consult as needed for change in signs or symptoms

## 2022-07-15 NOTE — PLAN OF CARE
Problem: PAIN - ADULT  Goal: Verbalizes/displays adequate comfort level or baseline comfort level  Description: Interventions:  - Encourage patient to monitor pain and request assistance  - Assess pain using appropriate pain scale  - Administer analgesics based on type and severity of pain and evaluate response  - Implement non-pharmacological measures as appropriate and evaluate response  - Consider cultural and social influences on pain and pain management  - Notify physician/advanced practitioner if interventions unsuccessful or patient reports new pain  Outcome: Progressing     Problem: SAFETY ADULT  Goal: Patient will remain free of falls  Description: INTERVENTIONS:  - Educate patient/family on patient safety including physical limitations  - Instruct patient to call for assistance with activity   - Consult OT/PT to assist with strengthening/mobility   - Keep Call bell within reach  - Keep bed low and locked with side rails adjusted as appropriate  - Keep care items and personal belongings within reach  - Initiate and maintain comfort rounds  - Make Fall Risk Sign visible to staff  - Offer Toileting every 2 Hours, in advance of need  - Initiate/Maintain bed alarm  - Apply yellow socks and bracelet for high fall risk patients  - Consider moving patient to room near nurses station  Outcome: Progressing  Goal: Maintain or return to baseline ADL function  Description: INTERVENTIONS:  -  Assess patient's ability to carry out ADLs; assess patient's baseline for ADL function and identify physical deficits which impact ability to perform ADLs (bathing, care of mouth/teeth, toileting, grooming, dressing, etc )  - Assess/evaluate cause of self-care deficits   - Assess range of motion  - Assess patient's mobility; develop plan if impaired  - Assess patient's need for assistive devices and provide as appropriate  - Encourage maximum independence but intervene and supervise when necessary  - Involve family in performance of ADLs  - Assess for home care needs following discharge   - Consider OT consult to assist with ADL evaluation and planning for discharge  - Provide patient education as appropriate  Outcome: Progressing  Goal: Maintains/Returns to pre admission functional level  Description: INTERVENTIONS:  - Perform BMAT or MOVE assessment daily    - Set and communicate daily mobility goal to care team and patient/family/caregiver  - Collaborate with rehabilitation services on mobility goals if consulted  - Perform Range of Motion 2 times a day  - Reposition patient every 2 hours  - Dangle patient 2 times a day  - Stand patient 2 times a day  - Ambulate patient 2 times a day  - Out of bed to chair 2 times a day   - Out of bed for meals 2 times a day  - Out of bed for toileting  - Record patient progress and toleration of activity level   Outcome: Progressing     Problem: DISCHARGE PLANNING  Goal: Discharge to home or other facility with appropriate resources  Description: INTERVENTIONS:  - Identify barriers to discharge w/patient and caregiver  - Arrange for needed discharge resources and transportation as appropriate  - Identify discharge learning needs (meds, wound care, etc )  - Arrange for interpretive services to assist at discharge as needed  - Refer to Case Management Department for coordinating discharge planning if the patient needs post-hospital services based on physician/advanced practitioner order or complex needs related to functional status, cognitive ability, or social support system  Outcome: Progressing     Problem: Knowledge Deficit  Goal: Patient/family/caregiver demonstrates understanding of disease process, treatment plan, medications, and discharge instructions  Description: Complete learning assessment and assess knowledge base    Interventions:  - Provide teaching at level of understanding  - Provide teaching via preferred learning methods  Outcome: Progressing

## 2022-07-15 NOTE — ASSESSMENT & PLAN NOTE
· Dietary consult  · Discussed with patient that would be beneficial to establish care with primary care doctor, he has one in our system however this does not remember meeting this person

## 2022-07-16 ENCOUNTER — APPOINTMENT (INPATIENT)
Dept: RADIOLOGY | Facility: HOSPITAL | Age: 22
DRG: 058 | End: 2022-07-16
Payer: COMMERCIAL

## 2022-07-16 PROBLEM — G93.2 IIH (IDIOPATHIC INTRACRANIAL HYPERTENSION): Status: ACTIVE | Noted: 2022-07-13

## 2022-07-16 LAB
ANION GAP SERPL CALCULATED.3IONS-SCNC: 7 MMOL/L (ref 4–13)
BUN SERPL-MCNC: 25 MG/DL (ref 5–25)
CALCIUM SERPL-MCNC: 9.7 MG/DL (ref 8.3–10.1)
CHLORIDE SERPL-SCNC: 111 MMOL/L (ref 100–108)
CO2 SERPL-SCNC: 22 MMOL/L (ref 21–32)
CREAT SERPL-MCNC: 1.17 MG/DL (ref 0.6–1.3)
GFR SERPL CREATININE-BSD FRML MDRD: 86 ML/MIN/1.73SQ M
GLUCOSE SERPL-MCNC: 99 MG/DL (ref 65–140)
POTASSIUM SERPL-SCNC: 4.1 MMOL/L (ref 3.5–5.3)
SODIUM SERPL-SCNC: 140 MMOL/L (ref 136–145)

## 2022-07-16 PROCEDURE — 99232 SBSQ HOSP IP/OBS MODERATE 35: CPT | Performed by: PSYCHIATRY & NEUROLOGY

## 2022-07-16 PROCEDURE — 62328 DX LMBR SPI PNXR W/FLUOR/CT: CPT | Performed by: INTERNAL MEDICINE

## 2022-07-16 PROCEDURE — 77003 FLUOROGUIDE FOR SPINE INJECT: CPT

## 2022-07-16 PROCEDURE — 80048 BASIC METABOLIC PNL TOTAL CA: CPT | Performed by: STUDENT IN AN ORGANIZED HEALTH CARE EDUCATION/TRAINING PROGRAM

## 2022-07-16 PROCEDURE — 009U3ZX DRAINAGE OF SPINAL CANAL, PERCUTANEOUS APPROACH, DIAGNOSTIC: ICD-10-PCS | Performed by: INTERNAL MEDICINE

## 2022-07-16 PROCEDURE — 99232 SBSQ HOSP IP/OBS MODERATE 35: CPT | Performed by: STUDENT IN AN ORGANIZED HEALTH CARE EDUCATION/TRAINING PROGRAM

## 2022-07-16 PROCEDURE — 62270 DX LMBR SPI PNXR: CPT

## 2022-07-16 RX ADMIN — CLONAZEPAM 0.5 MG: 0.5 TABLET ORAL at 21:47

## 2022-07-16 RX ADMIN — MELATONIN 3 MG: at 21:47

## 2022-07-16 RX ADMIN — ACETAZOLAMIDE 500 MG: 250 TABLET ORAL at 21:47

## 2022-07-16 RX ADMIN — ACETAZOLAMIDE 500 MG: 250 TABLET ORAL at 09:07

## 2022-07-16 RX ADMIN — ESCITALOPRAM OXALATE 10 MG: 10 TABLET ORAL at 09:07

## 2022-07-16 NOTE — BRIEF OP NOTE (RAD/CATH)
INTERVENTIONAL RADIOLOGY PROCEDURE NOTE    Date: 7/16/2022    Procedure: Procedure name not found  Preoperative diagnosis:   1  Decreased visual acuity    2  Acute nonintractable headache, unspecified headache type    3  BMI 50 0-59 9, adult (HCC)         Postoperative diagnosis: Same  Surgeon: Curtis Reed MD     Assistant: None  No qualified resident was available  Blood loss: Minimal  Specimens: none     Findings:     Fluoroscopically guided L3/L4 lumbar puncture  Opening pressure 18cm H20  Closing pressure < than 15cm, note procedure done prone, without continued drainage from needle(which was 15cm), closure pressure is below this amount, however uncertain how low  For this reason, and considering high volume lumbar puncture performed recently, only 15cc clear CSF removed  Complications: None immediate      Anesthesia: local

## 2022-07-16 NOTE — PLAN OF CARE
Problem: PAIN - ADULT  Goal: Verbalizes/displays adequate comfort level or baseline comfort level  Description: Interventions:  - Encourage patient to monitor pain and request assistance  - Assess pain using appropriate pain scale  - Administer analgesics based on type and severity of pain and evaluate response  - Implement non-pharmacological measures as appropriate and evaluate response  - Consider cultural and social influences on pain and pain management  - Notify physician/advanced practitioner if interventions unsuccessful or patient reports new pain  Outcome: Progressing     Problem: SAFETY ADULT  Goal: Patient will remain free of falls  Description: INTERVENTIONS:  - Educate patient/family on patient safety including physical limitations  - Instruct patient to call for assistance with activity   - Consult OT/PT to assist with strengthening/mobility   - Keep Call bell within reach  - Keep bed low and locked with side rails adjusted as appropriate  - Keep care items and personal belongings within reach  - Initiate and maintain comfort rounds  - Make Fall Risk Sign visible to staff  - Offer Toileting every 2 Hours, in advance of need  - Initiate/Maintain alarm  - Obtain necessary fall risk management equipment:   - Apply yellow socks and bracelet for high fall risk patients  - Consider moving patient to room near nurses station  Outcome: Progressing  Goal: Maintain or return to baseline ADL function  Description: INTERVENTIONS:  -  Assess patient's ability to carry out ADLs; assess patient's baseline for ADL function and identify physical deficits which impact ability to perform ADLs (bathing, care of mouth/teeth, toileting, grooming, dressing, etc )  - Assess/evaluate cause of self-care deficits   - Assess range of motion  - Assess patient's mobility; develop plan if impaired  - Assess patient's need for assistive devices and provide as appropriate  - Encourage maximum independence but intervene and supervise when necessary  - Involve family in performance of ADLs  - Assess for home care needs following discharge   - Consider OT consult to assist with ADL evaluation and planning for discharge  - Provide patient education as appropriate  Outcome: Progressing  Goal: Maintains/Returns to pre admission functional level  Description: INTERVENTIONS:  - Perform BMAT or MOVE assessment daily    - Set and communicate daily mobility goal to care team and patient/family/caregiver  - Collaborate with rehabilitation services on mobility goals if consulted  - Perform Range of Motion  times a day  - Reposition patient every 2 hours  - Dangle patient  times a day  - Stand patient  times a day  - Ambulate patient  times a day  - Out of bed to chair times a day   - Out of bed for meals  times a day  - Out of bed for toileting  - Record patient progress and toleration of activity level   Outcome: Progressing     Problem: DISCHARGE PLANNING  Goal: Discharge to home or other facility with appropriate resources  Description: INTERVENTIONS:  - Identify barriers to discharge w/patient and caregiver  - Arrange for needed discharge resources and transportation as appropriate  - Identify discharge learning needs (meds, wound care, etc )  - Arrange for interpretive services to assist at discharge as needed  - Refer to Case Management Department for coordinating discharge planning if the patient needs post-hospital services based on physician/advanced practitioner order or complex needs related to functional status, cognitive ability, or social support system  Outcome: Progressing     Problem: Knowledge Deficit  Goal: Patient/family/caregiver demonstrates understanding of disease process, treatment plan, medications, and discharge instructions  Description: Complete learning assessment and assess knowledge base    Interventions:  - Provide teaching at level of understanding  - Provide teaching via preferred learning methods  Outcome: Progressing

## 2022-07-16 NOTE — PROGRESS NOTES
NEUROLOGY RESIDENCY PROGRESS NOTE     Name: Margarita Keane   Age & Sex: 23 y o  male   MRN: [id number]  Unit/Bed#: [facility] 706-01   Encounter: [id number]    - Recommendations for outpatient neurological follow up have yet to be determined  - Pending for discharge: Monitoring Visual change progression and repeat therapeutic LP    ASSESSMENT & PLAN     * IIH (idiopathic intracranial hypertension)  Assessment & Plan  Assessment:  Patient is a 23 YOM w/ limited PMH on no medications who p/w binocular vision loss for approximately 4-5 days preceded by left shoulder pain that radiated up the lateral neck and posterior skull to apex of head that started after working out  Visual deficit started as blurring w/ a feeling of eye irritation but no overt pain  Workup:    CTH/CTA: negative for acute blood, signs of ischemia, LVO or critical stenosis and no vertebral defect concerning for dissection   MRI brain and orbit w/wo results: showed evidence of intracranial HTN with no evidence of demyelinating disease   MRV brain and orbit: significant stenosis of bilateral dural venous sinuses with no evidence of venous thrombosis   Labs: ESR: 25, CRP 24 7   CSF labs:   o lactic, protein, glucose normal  o Meningitis labs normal,   o Nothing found on gram stain of CSF  o 07/14 S/p IR LP opening pressure: 55    Plan:  - Repeat IR-LP to be completed today 07/16 as there are again decreased visual ability with increased darkness per patient  IR was notified and asked to please record opening pressure, closing pressure, amount of CSF removed  - Continue acetazolamide 500mg BID  - Continue to hold DVT ppx and plan to restart after LP when appropriate  - Sequential compression device for DVT ppx  - Stat CTH for acute changes in neurologic exam or mental status  - Medical management per primary team  - Nutrition consultation placed  - Neurology will continue to follow    SUBJECTIVE     Patient was seen and examined   No acute events overnight  Mr Jody Negron was sitting upright in bed this AM with his mother at bedside  He states that he does feel better however he is concerned about his vision  He states that since admission his vision is much better however since yesterday he states that there is mild decrease in vision where "more darkness" is decribed  He states that otherwise he is not experiencing somatic pain, HA, CP, SOB, ABD-pain, N/V/D/C/BBI, or new changes in sensorium  He reports that he is able to urinate, defecate, feed and ambulate self  Review of Systems   Constitutional: Negative for activity change, chills, diaphoresis, fatigue and fever  HENT: Negative for ear discharge, ear pain, hearing loss, sinus pressure, sinus pain, sore throat, tinnitus, trouble swallowing and voice change  Eyes: Positive for visual disturbance  Negative for photophobia, pain, discharge, redness and itching  Respiratory: Negative for cough, chest tightness, shortness of breath and wheezing  Cardiovascular: Negative for chest pain and palpitations  Gastrointestinal: Negative for abdominal pain, constipation, diarrhea, nausea and vomiting  Genitourinary: Negative for dysuria, frequency, hematuria and urgency  Musculoskeletal: Negative for arthralgias, gait problem and neck pain  Skin: Negative for color change  Neurological: Negative for dizziness, tremors, seizures, syncope, facial asymmetry, speech difficulty, weakness, light-headedness, numbness and headaches  Psychiatric/Behavioral: Negative for agitation, behavioral problems, confusion, decreased concentration, dysphoric mood, hallucinations, self-injury, sleep disturbance and suicidal ideas  The patient is not nervous/anxious and is not hyperactive  OBJECTIVE     Patient ID: Shari Guillen is a 23 y o  male      Vitals:    07/15/22 1532 07/15/22 2129 07/15/22 2142 07/16/22 0719   BP: 131/74 118/81 118/76 140/91   Pulse: 79      Resp:    18   Temp: 97 6 °F (36 4 °C) 98 2 °F (36 8 °C) 98 6 °F (37 °C) 97 9 °F (36 6 °C)   TempSrc:       SpO2: 94%      Weight:          Temperature:   Temp (24hrs), Av °F (36 7 °C), Min:97 6 °F (36 4 °C), Max:98 6 °F (37 °C)    Temperature: 97 9 °F (36 6 °C)    Physical Exam  Vitals and nursing note reviewed  Constitutional:       Appearance: He is obese  HENT:      Head: Normocephalic and atraumatic  Nose: Nose normal       Mouth/Throat:      Mouth: Mucous membranes are moist       Pharynx: No oropharyngeal exudate  Eyes:      General: Visual field deficit present  Extraocular Movements: Extraocular movements intact  Pupils:      Right eye: Pupil is not reactive  Comments: Right Afferent Pupillary Defect   Cardiovascular:      Rate and Rhythm: Normal rate  Pulses: Normal pulses  Pulmonary:      Effort: Pulmonary effort is normal    Abdominal:      Palpations: Abdomen is soft  Musculoskeletal:         General: Normal range of motion  Cervical back: Normal range of motion  Skin:     General: Skin is warm  Capillary Refill: Capillary refill takes less than 2 seconds  Neurological:      Mental Status: He is alert and oriented to person, place, and time  Cranial Nerves: Cranial nerve deficit present  Sensory: No sensory deficit  Motor: No weakness  Coordination: Coordination normal       Gait: Gait is intact  Gait normal       Deep Tendon Reflexes: Strength normal  Reflexes normal       Reflex Scores:       Tricep reflexes are 2+ on the right side and 2+ on the left side  Bicep reflexes are 2+ on the right side and 2+ on the left side  Brachioradialis reflexes are 2+ on the right side and 2+ on the left side  Patellar reflexes are 2+ on the right side and 2+ on the left side  Achilles reflexes are 2+ on the right side and 2+ on the left side    Psychiatric:         Mood and Affect: Mood normal           Neurologic Exam     Mental Status   Oriented to person, place, and time  Oriented to time  Oriented to year, month and date  Follows 2 step commands  Attention: normal  Concentration: normal    Level of consciousness: alert  Knowledge: good  Able to name object  Able to repeat  Normal comprehension  Cranial Nerves     CN II   Visual acuity: decreased    CN III, IV, VI   Pupils: dilated  Right pupil: Reactivity: non-reactive  Nystagmus: none     CN V   Facial sensation intact  CN VII   Facial expression full, symmetric  CN VIII   CN VIII normal      CN IX, X   CN IX normal    CN X normal      CN XI   CN XI normal      CN XII   CN XII normal      Decreased visual fields bilaterally in all sectors  Vision described as being able to see shapes and colors however finer grain imaging cannot be deciphered including counting fingers or properly following a pen on OEM testing  A right afferent pupillary defect was noted in examination  Motor Exam   Muscle bulk: normal  Overall muscle tone: normal  Right arm tone: normal  Left arm tone: normal  Right arm pronator drift: absent  Left arm pronator drift: absent  Right leg tone: normal  Left leg tone: normal    Strength   Strength 5/5 throughout  Sensory Exam   Light touch normal    Vibration normal    Proprioception normal      Gait, Coordination, and Reflexes     Gait  Gait: normal    Reflexes   Right brachioradialis: 2+  Left brachioradialis: 2+  Right biceps: 2+  Left biceps: 2+  Right triceps: 2+  Left triceps: 2+  Right patellar: 2+  Left patellar: 2+  Right achilles: 2+  Left achilles: 2+  Right : 2+  Left : 2+  Right plantar: normal  Left plantar: normal  Right ankle clonus: absent  Left ankle clonus: absent     LABORATORY DATA     Labs: I have personally reviewed pertinent reports         Results from last 7 days   Lab Units 07/15/22  0556 07/14/22  0609 07/13/22 1951   WBC Thousand/uL 10 56* 9 35 10 57*   HEMOGLOBIN g/dL 17 1* 16 5 18 2*   HEMATOCRIT % 50 9* 49 2 52 7*   PLATELETS Thousands/uL 195 162 178   NEUTROS PCT %  --  70 77*   MONOS PCT %  --  10 8      Results from last 7 days   Lab Units 07/16/22  0540 07/15/22  0556 07/14/22  0609   SODIUM mmol/L 140 141 139   POTASSIUM mmol/L 4 1 3 8 3 8   CHLORIDE mmol/L 111* 109* 106   CO2 mmol/L 22 22 27   BUN mg/dL 25 22 11   CREATININE mg/dL 1 17 1 07 0 81   CALCIUM mg/dL 9 7 9 5 9 5   ALK PHOS U/L  --   --  60   ALT U/L  --   --  44   AST U/L  --   --  22     Results from last 7 days   Lab Units 07/14/22  0609   MAGNESIUM mg/dL 1 8          Results from last 7 days   Lab Units 07/15/22  0556 07/14/22  0609   INR  0 94 1 00   PTT seconds  --  27               IMAGING & DIAGNOSTIC TESTING     Radiology Results: I have personally reviewed pertinent reports  MRV head wo contrast   Final Result by Samra Terrell MD (07/15 0901)      No evidence of dural venous sinus thrombosis  Severe stenosis in bilateral transverse-sigmoid dural venous sinus junction  Workstation performed: [id number]         [place]-patient lumbar puncture   Final Result by Nelia Mallory MD (07/14 1326)      Successful fluoroscopically guided lumbar puncture with an opening pressure of 55 cm H2O  Approximately 25 mL's of clear colorless CSF was removed and sent to lab for requested analysis  Closing pressure was 10 cm H2O  I reviewed the above findings and procedure with Dr Celeste Elliott  PERFORMED, DICTATED AND SIGNED BY: Darcie Law PA-C      Workstation performed: [id number]         MRI brain and orbits wo and w contrast   Final Result by Meghana Cordero MD (07/14 6396)      1  MRI evidences suggesting intracranial hypertension  Recommend lumbar puncture for further evaluation/confirmation  2   No evidence of optic neuritis        3   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             Workstation performed: [id number]         CTA head and neck with and without contrast   Final Result by Robin Harmon MD (07/13 2141)      No stenosis, dissection or occlusion of the carotid or vertebral arteries or major vessels of the [place]  Workstation performed: [id number]         CT head wo contrast   Final Result by Gracie Swan DO (07/13 2014)      No acute intracranial abnormality  Workstation performed: [id number]         FL lumbar puncture diagnostic    (Results Pending)   IR lumbar puncture    (Results Pending)     Other Diagnostic Testing: I have personally reviewed pertinent reports  ACTIVE MEDICATIONS     Current Facility-Administered Medications   Medication Dose Route Frequency    acetaZOLAMIDE (DIAMOX) tablet 500 mg  500 mg Oral Q12H [address]    clonazePAM (KlonoPIN) tablet 0 5 mg  0 5 mg Oral HS    cyclobenzaprine (FLEXERIL) tablet 10 mg  10 mg Oral BID PRN    escitalopram (LEXAPRO) tablet 10 mg  10 mg Oral Daily    melatonin tablet 3 mg  3 mg Oral HS     Prior to Admission medications    Medication Sig Start Date End Date Taking?  Authorizing Provider   cyclobenzaprine (FLEXERIL) 10 mg tablet Take 1 tablet (10 mg total) by mouth 2 (two) times a day as needed for muscle spasms 7/7/22   Chris Wells PA-C   escitalopram (LEXAPRO) 10 mg tablet Take 1 tablet by mouth daily [facility] 6/2/17   Gian Frederick   ketorolac (TORADOL) 10 mg tablet Take 1 tablet (10 mg total) by mouth every 6 (six) hours as needed for moderate pain 7/7/22   Gunjan Wells PA-C   naproxen (NAPROSYN) 500 mg tablet Take 1 tablet (500 mg total) by mouth 2 (two) times a day with meals for 7 days 7/29/20 8/5/20  Gaurav Cruz PA-C     VTE Pharmacologic Prophylaxis: Pharmacologic VTE Prophylaxis contraindicated due to Pending procedure (LP)  VTE Mechanical Prophylaxis: sequential compression device    ==  DO Mago Armendariz [facility] Neurology Residency, PGY-2

## 2022-07-16 NOTE — ASSESSMENT & PLAN NOTE
· At baseline 3 weeks ago patient reports his right eye had 25% visibility (since he was 21years old) and his left eye was normal   · Noticed decreased vision after going to gym  Also w/ left sided neck pain  · Has right apparent pupillary defect on exam  · CTH/CTA negative in the ER  · admit to medicine on telemetry  · Consult Neurology, initial concern for demylenation process, now more concerning for idiopathic intracranial hypertension  · MRI w/ evidence of intracranial hypertension, lumbar puncture 7/14 with opening pressure 55, started on acetazolamide  · Per Neurology, repeat LP with opening pressures today as vision slightly worse per pt report  · Hold on DVT PPX for now   · CVA protocol with neuro checks     · Follow-up extensive labs Post-Care Instructions: Patient instructed to apply ice to reduce swelling.

## 2022-07-16 NOTE — DISCHARGE INSTRUCTIONS
Lumbar Puncture     WHAT YOU NEED TO KNOW:   Lumbar puncture (LP) is a procedure in which a needle is inserted in your back and into your spinal canal  This is usually done to collect cerebrospinal fluid (CSF) to check for an infection, inflammation, bleeding, or other conditions that affect the brain  CSF is a clear, protective fluid that flows around the brain and inside the spinal canal  LP may also be done to remove CSF to reduce pressure in the brain  DISCHARGE INSTRUCTIONS:     Follow up with your healthcare provider as directed: Write down your questions so you remember to ask them during your visits  Post-lumbar puncture headache: You may develop a headache during the first few hours after your LP that may last for several days  The headache may be mild to severe and may get worse when you sit or stand  The following may help ease a post-lumbar puncture headache:  Drink plenty of liquids: You should drink more liquid than usual after your LP  Ask how much liquid is right for you  Caffeine may be used to treat a headache  Drinks, such as coffee, tea, or some sodas, have caffeine  Ask a Do not drink alcohol  Lie down: If you have a headache after your lumbar puncture, it may be helpful to lie down and rest   You may have a slight soreness over the LP area  This is normal   Remove the band aid or dressing in 24 hours  Contact Interventional Radiology imediately  at [phone] Jeremy PATIENTS: Contact Interventional Radiology at [phone]) Luke Tomlinson PATIENTS: Contact Interventional Radiology at [phone]) if any of the following occur: You have a severe headache that does not get better after you lie down  Persistent nausea or vomiting   You have a fever  You have a stiff neck or have trouble thinking clearly  Your legs, feet, or other parts below the waist feel numb, tingly, or weak  You have bleeding or a discharge coming from the area where the needle was put into your back     You have severe pain in your back or neck

## 2022-07-16 NOTE — PROGRESS NOTES
[address] [facility]  Progress Note - Nate Pham 2000, 23 y o  male MRN: [id number]  Unit/Bed#: [facility] 706-01 Encounter: [id number]  Primary Care Provider: Michi Magaña MD   Date and time admitted to hospital: 7/13/2022  6:44 PM    IIH (idiopathic intracranial hypertension)  Assessment & Plan  · At baseline 3 weeks ago patient reports his right eye had 25% visibility (since he was 21years old) and his left eye was normal   · Noticed decreased vision after going to gym  Also w/ left sided neck pain  · Has right apparent pupillary defect on exam  · CTH/CTA negative in the ER  · admit to medicine on telemetry  · Consult Neurology, initial concern for demylenation process, now more concerning for idiopathic intracranial hypertension  · MRI w/ evidence of intracranial hypertension, lumbar puncture 7/14 with opening pressure 55, started on acetazolamide  · Per Neurology, repeat LP with opening pressures today as vision slightly worse per pt report  · Hold on DVT PPX for now   · CVA protocol with neuro checks  · Follow-up extensive labs       * Decreased visual acuity  Assessment & Plan  See IIH      Acute nonintractable headache  Assessment & Plan  Resolved    BMI 50 0-59 9, adult [facility])  Assessment & Plan  · Dietary consult  · Discussed with patient that would be beneficial to establish care with primary care doctor, he has one in our system however this does not remember meeting this person    Severe episode of recurrent major depressive disorder, without psychotic features ([facility] [address] )  Assessment & Plan  · Continue Lexapro        VTE Pharmacologic Prophylaxis: VTE Score: 3 Moderate Risk (Score 3-4) - Pharmacological DVT Prophylaxis Contraindicated  Sequential Compression Devices Ordered      Patient Centered Rounds: I evaluated the patient without nursing staff present due to RN busy elsewhere  Discussions with Specialists or Other Care Team Provider:     Education and Discussions with Family / Patient: Updated  (mother) at bedside  Time Spent for Care: 20 minutes  More than 50% of total time spent on counseling and coordination of care as described above  Current Length of Stay: 3 day(s)  Current Patient Status: Inpatient   Certification Statement: The patient will continue to require additional inpatient hospital stay due to [address], vision changes  Discharge Plan: Anticipate discharge in 24-48 hrs to home  Code Status: Level 1 - Full Code    Subjective:   Vision initially dimmed this AM, d/w neuro and brought for urgent lumbar puncture, opening pressure 18 (improved from 55 on initial LP)  Seen this afternoon, patient feels vision has stabilized and perhaps slightly improved from this morning  No further headaches, no pain currently in lower back near LP site, no other acute concerns  [place] he did well working with PT/OT, and will be able to return home with assistance of family  Objective:     Vitals:   Temp (24hrs), Av 2 °F (36 8 °C), Min:97 9 °F (36 6 °C), Max:98 6 °F (37 °C)    Temp:  [97 9 °F (36 6 °C)-98 6 °F (37 °C)] 98 1 °F (36 7 °C)  Resp:  [18] 18  BP: (118-140)/(65-91) 122/65  Body mass index is 55 33 kg/m²  Input and Output Summary (last 24 hours):   No intake or output data in the 24 hours ending 22 9837    Physical Exam:   Physical Exam  Vitals reviewed  Constitutional:       General: He is not in acute distress  Appearance: He is not toxic-appearing  HENT:      Nose: Nose normal       Mouth/Throat:      Mouth: Mucous membranes are moist    Eyes:      General: No scleral icterus  Comments: Pupils equal, about 5-6mm each c/w prior   Cardiovascular:      Rate and Rhythm: Normal rate and regular rhythm  Heart sounds: Normal heart sounds  Pulmonary:      Effort: Pulmonary effort is normal  No respiratory distress  Breath sounds: Normal breath sounds  Abdominal:      General: Abdomen is flat   Bowel sounds are normal  Palpations: Abdomen is soft  Musculoskeletal:      Cervical back: Normal range of motion  No rigidity  Right lower leg: No edema  Left lower leg: No edema  Skin:     General: Skin is warm and dry  Neurological:      Mental Status: He is oriented to person, place, and time  Comments: At baseline, see neuro note for detailed daily neuro exam     Psychiatric:         Mood and Affect: Mood normal          Behavior: Behavior normal           Additional Data:     Labs:  Results from last 7 days   Lab Units 07/15/22  0556 07/14/22  0609   WBC Thousand/uL 10 56* 9 35   HEMOGLOBIN g/dL 17 1* 16 5   HEMATOCRIT % 50 9* 49 2   PLATELETS Thousands/uL 195 162   NEUTROS PCT %  --  70   LYMPHS PCT %  --  17   MONOS PCT %  --  10   EOS PCT %  --  1     Results from last 7 days   Lab Units 07/16/22  0540 07/15/22  0556 07/14/22  0609   SODIUM mmol/L 140   < > 139   POTASSIUM mmol/L 4 1   < > 3 8   CHLORIDE mmol/L 111*   < > 106   CO2 mmol/L 22   < > 27   BUN mg/dL 25   < > 11   CREATININE mg/dL 1 17   < > 0 81   ANION GAP mmol/L 7   < > 6   CALCIUM mg/dL 9 7   < > 9 5   ALBUMIN g/dL  --   --  3 3*   TOTAL BILIRUBIN mg/dL  --   --  0 75   ALK PHOS U/L  --   --  60   ALT U/L  --   --  44   AST U/L  --   --  22   GLUCOSE RANDOM mg/dL 99   < > 89    < > = values in this interval not displayed  Results from last 7 days   Lab Units 07/15/22  0556   INR  0 94         Results from last 7 days   Lab Units 07/14/22  0609   HEMOGLOBIN A1C % 5 4           Lines/Drains:  Invasive Devices  Report    Peripheral Intravenous Line  Duration           Peripheral IV 07/13/22 Left Antecubital 2 days                      Imaging: No pertinent imaging reviewed      Recent Cultures (last 7 days):   Results from last 7 days   Lab Units 07/14/22  1031   GRAM STAIN RESULT  No No Polys or Bacteria seen       Last 24 Hours Medication List:   Current Facility-Administered Medications   Medication Dose Route Frequency Provider Last Rate    acetaZOLAMIDE  500 mg Oral Q12H Roby Caldwell MD      clonazePAM  0 5 mg Oral HS Collin Bird MD      cyclobenzaprine  10 mg Oral BID PRN Shan Palms, DO      escitalopram  10 mg Oral Daily Shan Palms, DO      melatonin  3 mg Oral HS Mary Katz MD          Today, Patient Was Seen By: Malka Galindo MD    **Please Note: This note may have been constructed using a voice recognition system  **

## 2022-07-16 NOTE — SEDATION DOCUMENTATION
Lumbar puncture completed by Dr Valente Minaya  Adhesive bandage to puncture site clean, dry and intact  Pt transported back to room  Report called to FATOU Cavanaugh

## 2022-07-17 PROCEDURE — 99232 SBSQ HOSP IP/OBS MODERATE 35: CPT | Performed by: PSYCHIATRY & NEUROLOGY

## 2022-07-17 PROCEDURE — 99232 SBSQ HOSP IP/OBS MODERATE 35: CPT | Performed by: STUDENT IN AN ORGANIZED HEALTH CARE EDUCATION/TRAINING PROGRAM

## 2022-07-17 RX ORDER — CLONAZEPAM 0.5 MG/1
0.5 TABLET ORAL
Status: DISCONTINUED | OUTPATIENT
Start: 2022-07-17 | End: 2022-07-20 | Stop reason: HOSPADM

## 2022-07-17 RX ADMIN — MELATONIN 3 MG: at 21:26

## 2022-07-17 RX ADMIN — ACETAZOLAMIDE 500 MG: 250 TABLET ORAL at 21:26

## 2022-07-17 RX ADMIN — CYCLOBENZAPRINE HYDROCHLORIDE 10 MG: 10 TABLET, FILM COATED ORAL at 09:23

## 2022-07-17 RX ADMIN — ACETAZOLAMIDE 500 MG: 250 TABLET ORAL at 09:24

## 2022-07-17 RX ADMIN — ESCITALOPRAM OXALATE 10 MG: 10 TABLET ORAL at 09:23

## 2022-07-17 NOTE — ASSESSMENT & PLAN NOTE
· At baseline 3 weeks ago patient reports his right eye had 25% visibility (since he was 21years old) and his left eye was normal   · Noticed decreased vision after going to gym  Also w/ left sided neck pain     · Has right apparent pupillary defect on exam  · CTH/CTA negative in the ER  · Consult Neurology, initial concern for demylenation process, now more concerning for idiopathic intracranial hypertension  · MRI w/ evidence of intracranial hypertension, lumbar puncture 7/14 with opening pressure 55, started on acetazolamide  · Per Neurology, repeat LP with opening pressures 7/16 as vision slightly worse per pt report, opening pressure 18  · Hold on DVT PPX for now   · Follow-up extensive labs   ·

## 2022-07-17 NOTE — PROGRESS NOTES
NEUROLOGY RESIDENCY PROGRESS NOTE     Name: Olvin Mahmood   Age & Sex: 23 y o  male   MRN: [id number]  Unit/Bed#: [facility] 706-01   Encounter: [id number]    Olvin Mahmood will need follow up in in 4 weeks with general Neurology attending/resident or AP  He will not require outpatient neurological testing  Pending for discharge:  None from neurological standpoint    ASSESSMENT & PLAN   23year old male with acute bilateral optic neuropathy in the setting of untreated chronic idiopathic intracranial hypertension  Continue acetazolamide 500 mg b i d  Recommend following up with [facility] as soon as possible and General Neurology in 4-6 weeks  IIH (idiopathic intracranial hypertension)  Assessment & Plan  Assessment:  Patient is a 23 YOM w/ limited PMH on no medications who p/w binocular vision loss for approximately 4-5 days preceded by left shoulder pain that radiated up the lateral neck and posterior skull to apex of head that started after working out  Visual deficit started as blurring w/ a feeling of eye irritation but no overt pain      Workup:    CTH/CTA: negative for acute blood, signs of ischemia, LVO or critical stenosis and no vertebral defect concerning for dissection   MRI brain and orbit w/wo results: showed evidence of intracranial HTN with no evidence of demyelinating disease   MRV brain and orbit: significant stenosis of bilateral dural venous sinuses with no evidence of venous thrombosis   Labs: ESR: 25, CRP 24 7   CSF labs:   o lactic, protein, glucose, Meningitis labs normal, negative gram stain  o 07/14 S/p IR LP opening pressure: 55  o 07/16 s/p repeat IR LP opening pressure: 18    Plan:    - Continue acetazolamide 500mg BID  -Can resume DVT ppx as appropriate per primary team  - [facility] for acute changes in neurologic exam or mental status  - Medical management per primary team  - Nutrition consulted and appreciate recommendations   -Recommend to follow up with [facility] hospital as soon as possible  Requested MRI and MRV imaging on the discs for patient to bring them to [facility]   -No further management per neurology  Follow up with general neurology in 4-6 weeks after discharge to adjust acetazolamide dosages  SUBJECTIVE     Patient was seen and examined  No acute events overnight  His visual acuity is same as yesterday  Per patient, from left eye, he can only see 25-30% compared to 100% prior  From right eye, he can only see 10-15% compared to about 25 5% prior  Pertinent Negatives include: headaches, syncope, seizures, paralysis/weakness, numbness or tingling     Review of Systems   Constitutional: Negative for chills and fever  HENT: Negative for ear pain and sore throat  Eyes: Negative for pain  Reduced visual acuity of both eyes     Respiratory: Negative for cough and shortness of breath  Cardiovascular: Negative for chest pain and palpitations  Gastrointestinal: Negative for abdominal pain and vomiting  Genitourinary: Negative for dysuria and hematuria  Musculoskeletal: Negative for arthralgias and back pain  Skin: Negative for color change and rash  Neurological: Negative for seizures, syncope, weakness, numbness and headaches  All other systems reviewed and are negative  OBJECTIVE     Patient ID: Gela Maldonado is a 23 y o  male  Vitals:    22 2124 22 2125 22 0939 22 0940   BP: 123/76 123/76 119/68 119/68   Pulse:       Resp:       Temp: 97 8 °F (36 6 °C) 97 8 °F (36 6 °C) 97 7 °F (36 5 °C) 97 7 °F (36 5 °C)   TempSrc:       SpO2:       Weight:          Temperature:   Temp (24hrs), Av 8 °F (36 6 °C), Min:97 7 °F (36 5 °C), Max:98 1 °F (36 7 °C)    Temperature: 97 7 °F (36 5 °C)      Physical Exam  Vitals and nursing note reviewed  Constitutional:       Appearance: He is well-developed  HENT:      Head: Normocephalic and atraumatic        Nose: Nose normal    Eyes:      Extraocular Movements: EOM normal       Conjunctiva/sclera: Conjunctivae normal    Cardiovascular:      Rate and Rhythm: Normal rate  Pulmonary:      Effort: Pulmonary effort is normal  No respiratory distress  Abdominal:      Palpations: Abdomen is soft  Tenderness: There is no abdominal tenderness  Musculoskeletal:      Cervical back: Neck supple  Right lower leg: No edema  Left lower leg: No edema  Skin:     General: Skin is warm and dry  Neurological:      Mental Status: He is alert and oriented to person, place, and time  Gait: Gait is intact  Deep Tendon Reflexes: Strength normal    Psychiatric:         Speech: Speech normal           Neurologic Exam     Mental Status   Oriented to person, place, and time  Speech: speech is normal   Level of consciousness: alert    Cranial Nerves     CN II   Visual acuity: decreased  Right visual field deficit: upper nasal, lower nasal and lower temporal quadrant(s)  Left visual field deficit: no deficit but reduced acuity  Patient can see colors in peripheral vision but when he looks straight, he only sees dark shapes  CN III, IV, VI   Extraocular motions are normal    Right pupil: Consensual response: APD  Left pupil: Reactivity: brisk  Consensual response: intact  Nystagmus: none   Upgaze: normal  Downgaze: normal    CN V   Facial sensation intact  CN VII   Facial expression full, symmetric  CN VIII   CN VIII normal      CN IX, X   CN IX normal    CN X normal      CN XI   CN XI normal      CN XII   CN XII normal      Motor Exam   Muscle bulk: normal  Overall muscle tone: normal    Strength   Strength 5/5 throughout  Sensory Exam   Light touch normal      Gait, Coordination, and Reflexes     Gait  Gait: normal    Reflexes   Reflexes 2+ except as noted  LABORATORY DATA     Labs: I have personally reviewed pertinent reports      Results from last 7 days   Lab Units 07/15/22  0556 07/14/22  0609 07/13/22  1951   WBC Thousand/uL 10 56* 9 35 10 57*   HEMOGLOBIN g/dL 17 1* 16 5 18 2*   HEMATOCRIT % 50 9* 49 2 52 7*   PLATELETS Thousands/uL 195 162 178   NEUTROS PCT %  --  70 77*   MONOS PCT %  --  10 8      Results from last 7 days   Lab Units 07/16/22  0540 07/15/22  0556 07/14/22  0609   SODIUM mmol/L 140 141 139   POTASSIUM mmol/L 4 1 3 8 3 8   CHLORIDE mmol/L 111* 109* 106   CO2 mmol/L 22 22 27   BUN mg/dL 25 22 11   CREATININE mg/dL 1 17 1 07 0 81   CALCIUM mg/dL 9 7 9 5 9 5   ALK PHOS U/L  --   --  60   ALT U/L  --   --  44   AST U/L  --   --  22     Results from last 7 days   Lab Units 07/14/22  0609   MAGNESIUM mg/dL 1 8          Results from last 7 days   Lab Units 07/15/22  0556 07/14/22  0609   INR  0 94 1 00   PTT seconds  --  27               IMAGING & DIAGNOSTIC TESTING     Radiology Results: I have personally reviewed pertinent reports  MRV head wo contrast   Final Result by Concha Comer MD (07/15 0901)      No evidence of dural venous sinus thrombosis  Severe stenosis in bilateral transverse-sigmoid dural venous sinus junction  Workstation performed: [id number]         [facility] [address] IN-patient lumbar puncture   Final Result by Dl Cunningham MD (07/14 1326)      Successful fluoroscopically guided lumbar puncture with an opening pressure of 55 cm H2O  Approximately 25 mL's of clear colorless CSF was removed and sent to lab for requested analysis  Closing pressure was 10 cm H2O  I reviewed the above findings and procedure with Dr Julian Figures  PERFORMED, DICTATED AND SIGNED BY: Mar Paniagua PA-C      Workstation performed: [id number]         MRI brain and orbits wo and w contrast   Final Result by Jw Villalpando MD (07/14 1180)      1  MRI evidences suggesting intracranial hypertension  Recommend lumbar puncture for further evaluation/confirmation  2   No evidence of optic neuritis        3   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             Workstation performed: [id number] head and neck with and without contrast   Final Result by Ilda Sandoval MD (07/13 2141)      No stenosis, dissection or occlusion of the carotid or vertebral arteries or major vessels of the [place]  Workstation performed: [id number]         CT head wo contrast   Final Result by Lily Angel DO (07/13 2014)      No acute intracranial abnormality  Workstation performed: [id number]         FL lumbar puncture diagnostic    (Results Pending)   IR lumbar puncture    (Results Pending)       Other Diagnostic Testing: I have personally reviewed pertinent reports  ACTIVE MEDICATIONS     Current Facility-Administered Medications   Medication Dose Route Frequency    acetaZOLAMIDE (DIAMOX) tablet 500 mg  500 mg Oral Q12H [facility]    clonazePAM (KlonoPIN) tablet 0 5 mg  0 5 mg Oral HS    cyclobenzaprine (FLEXERIL) tablet 10 mg  10 mg Oral BID PRN    escitalopram (LEXAPRO) tablet 10 mg  10 mg Oral Daily    melatonin tablet 3 mg  3 mg Oral HS       Prior to Admission medications    Medication Sig Start Date End Date Taking?  Authorizing Provider   cyclobenzaprine (FLEXERIL) 10 mg tablet Take 1 tablet (10 mg total) by mouth 2 (two) times a day as needed for muscle spasms 7/7/22   Prudence Angel Wells PA-C   escitalopram (LEXAPRO) 10 mg tablet Take 1 tablet by mouth daily At [facility] 6/2/17   Gian Frederick   ketorolac (TORADOL) 10 mg tablet Take 1 tablet (10 mg total) by mouth every 6 (six) hours as needed for moderate pain 7/7/22   Tyrone Wells PA-C   naproxen (NAPROSYN) 500 mg tablet Take 1 tablet (500 mg total) by mouth 2 (two) times a day with meals for 7 days 7/29/20 8/5/20  Lovetta Sacks, PA-C       VTE Pharmacologic Prophylaxis: As appropriate per primary team  VTE Mechanical Prophylaxis: sequential compression device    ==  MD Zenia Flower's Neurology Residency, PGY-2

## 2022-07-17 NOTE — ASSESSMENT & PLAN NOTE
· See IIH  · Given patient's visual acuity drastically decreased, we will discuss with support team tomorrow to see how we can help him transition outpatient safely

## 2022-07-17 NOTE — MALNUTRITION/BMI
This medical record reflects one or more clinical indicators suggestive of  morbid obesity  BMI Findings:  Adult BMI Classifications: Morbid Obesity 50-59 9        Body mass index is 55 33 kg/m²  See Nutrition note dated 7/17/22 for additional details  Completed nutrition assessment is viewable in the nutrition documentation

## 2022-07-17 NOTE — PROGRESS NOTES
[address] [facility]  Progress Note - Julia Alston 2000, 23 y o  male MRN: [id number]  Unit/Bed#: [facility] 706-01 Encounter: [id number]  Primary Care Provider: Cole Guerra MD   Date and time admitted to hospital: 7/13/2022  6:44 PM    IIH (idiopathic intracranial hypertension)  Assessment & Plan  · At baseline 3 weeks ago patient reports his right eye had 25% visibility (since he was 21years old) and his left eye was normal   · Noticed decreased vision after going to gym  Also w/ left sided neck pain  · Has right apparent pupillary defect on exam  · CTH/CTA negative in the ER  · Consult Neurology, initial concern for demylenation process, now more concerning for idiopathic intracranial hypertension  · MRI w/ evidence of intracranial hypertension, lumbar puncture 7/14 with opening pressure 55, started on acetazolamide  · Per Neurology, repeat LP with opening pressures 7/16 as vision slightly worse per pt report, opening pressure 18  · Hold on DVT PPX for now   · Follow-up extensive labs   ·       * Decreased visual acuity  Assessment & Plan  · See IIH  · Given patient's visual acuity drastically decreased, we will discuss with support team tomorrow to see how we can help him transition outpatient safely      Acute nonintractable headache  Assessment & Plan  Resolved    BMI 50 0-59 9, adult [facility])  Assessment & Plan  · Dietary consult  · Discussed with patient that would be beneficial to establish care with primary care doctor, he has one in our system however this does not remember meeting this person    Severe episode of recurrent major depressive disorder, without psychotic features ([facility] [address] )  Assessment & Plan  · Continue Lexapro          VTE Pharmacologic Prophylaxis: VTE Score: 3 Moderate Risk (Score 3-4) - Pharmacological DVT Prophylaxis Contraindicated  Sequential Compression Devices Ordered      Patient Centered Rounds: I performed bedside rounds with nursing staff today   Discussions with Specialists or Other Care Team Provider:  Neurology    Education and Discussions with Family / Patient: Patient declined call to   Time Spent for Care: 20 minutes  More than 50% of total time spent on counseling and coordination of care as described above  Current Length of Stay: 4 day(s)  Current Patient Status: Inpatient   Certification Statement: The patient will continue to require additional inpatient hospital stay due to Safe discharge plan with proper support follow-up care for patient with severely decreased visual acuity  Discharge Plan: Anticipate discharge tomorrow to home with home services  Code Status: Level 1 - Full Code    Subjective:   Vision stable, able to see shapes especially in her peripheral vision, otherwise not able to see much  Healing at bedside in overwhelmed today  Discussed importance of establishing primary care doctor as bridge to outpatient, and that I will try to set him up with an appointment at [facility]  No other acute concerns, no headaches, no back pain at LP site  Objective:     Vitals:   Temp (24hrs), Av 9 °F (36 6 °C), Min:97 7 °F (36 5 °C), Max:98 3 °F (36 8 °C)    Temp:  [97 7 °F (36 5 °C)-98 3 °F (36 8 °C)] 98 3 °F (36 8 °C)  BP: (114-123)/(64-76) 114/64  Body mass index is 55 33 kg/m²  Input and Output Summary (last 24 hours):   No intake or output data in the 24 hours ending 22 6247    Physical Exam:   Physical Exam  Constitutional:       General: He is not in acute distress  Appearance: He is not toxic-appearing  HENT:      Mouth/Throat:      Mouth: Mucous membranes are moist    Cardiovascular:      Rate and Rhythm: Normal rate and regular rhythm  Heart sounds: Normal heart sounds  Pulmonary:      Effort: Pulmonary effort is normal  No respiratory distress  Breath sounds: Normal breath sounds  Abdominal:      General: Abdomen is flat   Bowel sounds are normal  Palpations: Abdomen is soft  Musculoskeletal:      Right lower leg: No edema  Left lower leg: No edema  Skin:     General: Skin is warm and dry  Neurological:      Mental Status: He is alert and oriented to person, place, and time  Comments: R pupillary defect, see neuro note for detailed exam   Psychiatric:      Comments: Mood and affect congruent to situation          Additional Data:     Labs:  Results from last 7 days   Lab Units 07/15/22  0556 07/14/22  0609   WBC Thousand/uL 10 56* 9 35   HEMOGLOBIN g/dL 17 1* 16 5   HEMATOCRIT % 50 9* 49 2   PLATELETS Thousands/uL 195 162   NEUTROS PCT %  --  70   LYMPHS PCT %  --  17   MONOS PCT %  --  10   EOS PCT %  --  1     Results from last 7 days   Lab Units 07/16/22  0540 07/15/22  0556 07/14/22  0609   SODIUM mmol/L 140   < > 139   POTASSIUM mmol/L 4 1   < > 3 8   CHLORIDE mmol/L 111*   < > 106   CO2 mmol/L 22   < > 27   BUN mg/dL 25   < > 11   CREATININE mg/dL 1 17   < > 0 81   ANION GAP mmol/L 7   < > 6   CALCIUM mg/dL 9 7   < > 9 5   ALBUMIN g/dL  --   --  3 3*   TOTAL BILIRUBIN mg/dL  --   --  0 75   ALK PHOS U/L  --   --  60   ALT U/L  --   --  44   AST U/L  --   --  22   GLUCOSE RANDOM mg/dL 99   < > 89    < > = values in this interval not displayed  Results from last 7 days   Lab Units 07/15/22  0556   INR  0 94         Results from last 7 days   Lab Units 07/14/22  0609   HEMOGLOBIN A1C % 5 4           Lines/Drains:  Invasive Devices  Report    None                       Imaging: No pertinent imaging reviewed      Recent Cultures (last 7 days):   Results from last 7 days   Lab Units 07/14/22  1031   GRAM STAIN RESULT  No No Polys or Bacteria seen       Last 24 Hours Medication List:   Current Facility-Administered Medications   Medication Dose Route Frequency Provider Last Rate    acetaZOLAMIDE  500 mg Oral Q12H Twan Tyson MD      clonazePAM  0 5 mg Oral HS PRN Forbes Babinski, MD      cyclobenzaprine  10 mg Oral BID PRN Kun Joy DO      escitalopram  10 mg Oral Daily Kun Joy DO      melatonin  3 mg Oral HS Mary Barnes MD          Today, Patient Was Seen By: La Elias MD    **Please Note: This note may have been constructed using a voice recognition system  **

## 2022-07-17 NOTE — PLAN OF CARE
Problem: PAIN - ADULT  Goal: Verbalizes/displays adequate comfort level or baseline comfort level  Description: Interventions:  - Encourage patient to monitor pain and request assistance  - Assess pain using appropriate pain scale  - Administer analgesics based on type and severity of pain and evaluate response  - Implement non-pharmacological measures as appropriate and evaluate response  - Consider cultural and social influences on pain and pain management  - Notify physician/advanced practitioner if interventions unsuccessful or patient reports new pain  Outcome: Progressing     Problem: SAFETY ADULT  Goal: Patient will remain free of falls  Description: INTERVENTIONS:  - Educate patient/family on patient safety including physical limitations  - Instruct patient to call for assistance with activity   - Consult OT/PT to assist with strengthening/mobility   - Keep Call bell within reach  - Keep bed low and locked with side rails adjusted as appropriate  - Keep care items and personal belongings within reach  - Initiate and maintain comfort rounds  - Make Fall Risk Sign visible to staff  - Apply yellow socks and bracelet for high fall risk patients  - Consider moving patient to room near nurses station  Outcome: Progressing  Goal: Maintain or return to baseline ADL function  Description: INTERVENTIONS:  -  Assess patient's ability to carry out ADLs; assess patient's baseline for ADL function and identify physical deficits which impact ability to perform ADLs (bathing, care of mouth/teeth, toileting, grooming, dressing, etc )  - Assess/evaluate cause of self-care deficits   - Assess range of motion  - Assess patient's mobility; develop plan if impaired  - Assess patient's need for assistive devices and provide as appropriate  - Encourage maximum independence but intervene and supervise when necessary  - Involve family in performance of ADLs  - Assess for home care needs following discharge   - Consider OT consult to assist with ADL evaluation and planning for discharge  - Provide patient education as appropriate  Outcome: Progressing  Goal: Maintains/Returns to pre admission functional level  Description: INTERVENTIONS:  - Perform BMAT or MOVE assessment daily    - Set and communicate daily mobility goal to care team and patient/family/caregiver  - Collaborate with rehabilitation services on mobility goals if consulted  - Out of bed for toileting  - Record patient progress and toleration of activity level   Outcome: Progressing     Problem: DISCHARGE PLANNING  Goal: Discharge to home or other facility with appropriate resources  Description: INTERVENTIONS:  - Identify barriers to discharge w/patient and caregiver  - Arrange for needed discharge resources and transportation as appropriate  - Identify discharge learning needs (meds, wound care, etc )  - Arrange for interpretive services to assist at discharge as needed  - Refer to Case Management Department for coordinating discharge planning if the patient needs post-hospital services based on physician/advanced practitioner order or complex needs related to functional status, cognitive ability, or social support system  Outcome: Progressing     Problem: Knowledge Deficit  Goal: Patient/family/caregiver demonstrates understanding of disease process, treatment plan, medications, and discharge instructions  Description: Complete learning assessment and assess knowledge base    Interventions:  - Provide teaching at level of understanding  - Provide teaching via preferred learning methods  Outcome: Progressing     Problem: Potential for Falls  Goal: Patient will remain free of falls  Description: INTERVENTIONS:  - Educate patient/family on patient safety including physical limitations  - Instruct patient to call for assistance with activity   - Consult OT/PT to assist with strengthening/mobility   - Keep Call bell within reach  - Keep bed low and locked with side rails adjusted as appropriate  - Keep care items and personal belongings within reach  - Initiate and maintain comfort rounds  - Make Fall Risk Sign visible to staff  - Apply yellow socks and bracelet for high fall risk patients  - Consider moving patient to room near nurses station  Outcome: Progressing

## 2022-07-18 LAB
REAGIN AB CSF QL: NON REACTIVE
SARS-COV-2 RNA RESP QL NAA+PROBE: NEGATIVE
TSH SERPL DL<=0.05 MIU/L-ACNC: 3.61 UIU/ML (ref 0.45–4.5)

## 2022-07-18 PROCEDURE — 84443 ASSAY THYROID STIM HORMONE: CPT | Performed by: PSYCHIATRY & NEUROLOGY

## 2022-07-18 PROCEDURE — U0003 INFECTIOUS AGENT DETECTION BY NUCLEIC ACID (DNA OR RNA); SEVERE ACUTE RESPIRATORY SYNDROME CORONAVIRUS 2 (SARS-COV-2) (CORONAVIRUS DISEASE [COVID-19]), AMPLIFIED PROBE TECHNIQUE, MAKING USE OF HIGH THROUGHPUT TECHNOLOGIES AS DESCRIBED BY CMS-2020-01-R: HCPCS | Performed by: STUDENT IN AN ORGANIZED HEALTH CARE EDUCATION/TRAINING PROGRAM

## 2022-07-18 PROCEDURE — U0005 INFEC AGEN DETEC AMPLI PROBE: HCPCS | Performed by: STUDENT IN AN ORGANIZED HEALTH CARE EDUCATION/TRAINING PROGRAM

## 2022-07-18 PROCEDURE — 99232 SBSQ HOSP IP/OBS MODERATE 35: CPT | Performed by: STUDENT IN AN ORGANIZED HEALTH CARE EDUCATION/TRAINING PROGRAM

## 2022-07-18 RX ADMIN — MELATONIN 3 MG: at 21:37

## 2022-07-18 RX ADMIN — ACETAZOLAMIDE 500 MG: 250 TABLET ORAL at 21:37

## 2022-07-18 RX ADMIN — ACETAZOLAMIDE 500 MG: 250 TABLET ORAL at 08:49

## 2022-07-18 RX ADMIN — ESCITALOPRAM OXALATE 10 MG: 10 TABLET ORAL at 08:49

## 2022-07-18 NOTE — PLAN OF CARE
Problem: PAIN - ADULT  Goal: Verbalizes/displays adequate comfort level or baseline comfort level  Description: Interventions:  - Encourage patient to monitor pain and request assistance  - Assess pain using appropriate pain scale  - Administer analgesics based on type and severity of pain and evaluate response  - Implement non-pharmacological measures as appropriate and evaluate response  - Consider cultural and social influences on pain and pain management  - Notify physician/advanced practitioner if interventions unsuccessful or patient reports new pain  Outcome: Progressing     Problem: SAFETY ADULT  Goal: Patient will remain free of falls  Description: INTERVENTIONS:  - Educate patient/family on patient safety including physical limitations  - Instruct patient to call for assistance with activity   - Consult OT/PT to assist with strengthening/mobility   - Keep Call bell within reach  - Keep bed low and locked with side rails adjusted as appropriate  - Keep care items and personal belongings within reach  - Initiate and maintain comfort rounds  - Make Fall Risk Sign visible to staff  - Offer Toileting every 3 Hours, in advance of need  - Apply yellow socks and bracelet for high fall risk patients  - Consider moving patient to room near nurses station  Outcome: Progressing  Goal: Maintain or return to baseline ADL function  Description: INTERVENTIONS:  -  Assess patient's ability to carry out ADLs; assess patient's baseline for ADL function and identify physical deficits which impact ability to perform ADLs (bathing, care of mouth/teeth, toileting, grooming, dressing, etc )  - Assess/evaluate cause of self-care deficits   - Assess range of motion  - Assess patient's mobility; develop plan if impaired  - Assess patient's need for assistive devices and provide as appropriate  - Encourage maximum independence but intervene and supervise when necessary  - Involve family in performance of ADLs  - Assess for home care needs following discharge   - Consider OT consult to assist with ADL evaluation and planning for discharge  - Provide patient education as appropriate  Outcome: Progressing  Goal: Maintains/Returns to pre admission functional level  Description: INTERVENTIONS:  - Perform BMAT or MOVE assessment daily    - Set and communicate daily mobility goal to care team and patient/family/caregiver  - Collaborate with rehabilitation services on mobility goals if consulted  - Perform Range of Motion 3 times a day  - Reposition patient every 3 hours  - Dangle patient 3 times a day  - Stand patient 3 times a day  - Ambulate patient 3 times a day  - Out of bed to chair 3 times a day   - Out of bed for meals 3 times a day  - Out of bed for toileting  - Record patient progress and toleration of activity level   Outcome: Progressing     Problem: DISCHARGE PLANNING  Goal: Discharge to home or other facility with appropriate resources  Description: INTERVENTIONS:  - Identify barriers to discharge w/patient and caregiver  - Arrange for needed discharge resources and transportation as appropriate  - Identify discharge learning needs (meds, wound care, etc )  - Arrange for interpretive services to assist at discharge as needed  - Refer to Case Management Department for coordinating discharge planning if the patient needs post-hospital services based on physician/advanced practitioner order or complex needs related to functional status, cognitive ability, or social support system  Outcome: Progressing     Problem: Knowledge Deficit  Goal: Patient/family/caregiver demonstrates understanding of disease process, treatment plan, medications, and discharge instructions  Description: Complete learning assessment and assess knowledge base    Interventions:  - Provide teaching at level of understanding  - Provide teaching via preferred learning methods  Outcome: Progressing     Problem: Potential for Falls  Goal: Patient will remain free of falls  Description: INTERVENTIONS:  - Educate patient/family on patient safety including physical limitations  - Instruct patient to call for assistance with activity   - Consult OT/PT to assist with strengthening/mobility   - Keep Call bell within reach  - Keep bed low and locked with side rails adjusted as appropriate  - Keep care items and personal belongings within reach  - Initiate and maintain comfort rounds  - Make Fall Risk Sign visible to staff  - Offer Toileting every 3 Hours, in advance of need  - Initiate/Maintain alarm  - Apply yellow socks and bracelet for high fall risk patients  - Consider moving patient to room near nurses station  Outcome: Progressing

## 2022-07-18 NOTE — ASSESSMENT & PLAN NOTE
· At baseline 3 weeks ago patient reports his right eye had 25% visibility (since he was 21years old) and his left eye was normal   · Noticed decreased vision after going to gym  Also w/ left sided neck pain     · Has right apparent pupillary defect on exam  · CTH/CTA negative in the ER  · Consult Neurology, initial concern for demylenation process, now more concerning for idiopathic intracranial hypertension vs other etiology (elevated opening pressure on LP, however no papilledema on funduscopic exam causing diagnostic uncertainty)  · MRI w/ evidence of intracranial hypertension, lumbar puncture 7/14 with opening pressure 55, started on acetazolamide  · Per Neurology, repeat LP with opening pressures 7/16 as vision slightly worse per pt report, opening pressure 18  · Cont to hold DVT prophylaxis for now in case patient needs repeat emergent LP, can discuss with Neurology going forward they think this is likely or if it is okay to start DVT prophylaxis  · Follow-up extensive labs   · Given patient's visual acuity drastically decreased and he is functionally blind, will be transferring to hospital with inpatient Neuro-Ophthalmology

## 2022-07-18 NOTE — ASSESSMENT & PLAN NOTE
· Elevated opening pressure on LP, however no papilledema on ophthalmology funduscopic exam  · It is certainly possible this is contributing to his vision loss, however given question of diagnosis plan to transfer patient to hospital is able to provide inpatient neuro ophthalmologic evaluation

## 2022-07-18 NOTE — RESTORATIVE TECHNICIAN NOTE
Restorative Technician Note      Patient Name: Roosvelt Stager     Note Type: Mobility  Patient Position Upon Consult: Supine  Activity Performed: Ambulated; Stood; Dangled  Assistive Device: Other (Comment) (Assist x1)  Education Provided: Yes  Patient Position at End of Consult: Supine;  All needs within reach; Bed/Chair alarm activated    Alban ROGER, Restorative Technician, [employer]

## 2022-07-18 NOTE — PLAN OF CARE
Problem: PAIN - ADULT  Goal: Verbalizes/displays adequate comfort level or baseline comfort level  Description: Interventions:  - Encourage patient to monitor pain and request assistance  - Assess pain using appropriate pain scale  - Administer analgesics based on type and severity of pain and evaluate response  - Implement non-pharmacological measures as appropriate and evaluate response  - Consider cultural and social influences on pain and pain management  - Notify physician/advanced practitioner if interventions unsuccessful or patient reports new pain  Outcome: Progressing     Problem: SAFETY ADULT  Goal: Patient will remain free of falls  Description: INTERVENTIONS:  - Educate patient/family on patient safety including physical limitations  - Instruct patient to call for assistance with activity   - Consult OT/PT to assist with strengthening/mobility   - Keep Call bell within reach  - Keep bed low and locked with side rails adjusted as appropriate  - Keep care items and personal belongings within reach  - Initiate and maintain comfort rounds  - Make Fall Risk Sign visible to staff  - Offer Toileting every 2 Hours, in advance of need  - Initiate/Maintain bed alarm  - Obtain necessary fall risk management equipment: alarm  - Apply yellow socks and bracelet for high fall risk patients  - Consider moving patient to room near nurses station  Outcome: Progressing     Problem: DISCHARGE PLANNING  Goal: Discharge to home or other facility with appropriate resources  Description: INTERVENTIONS:  - Identify barriers to discharge w/patient and caregiver  - Arrange for needed discharge resources and transportation as appropriate  - Identify discharge learning needs (meds, wound care, etc )  - Arrange for interpretive services to assist at discharge as needed  - Refer to Case Management Department for coordinating discharge planning if the patient needs post-hospital services based on physician/advanced practitioner order or complex needs related to functional status, cognitive ability, or social support system  Outcome: Progressing     Problem: Knowledge Deficit  Goal: Patient/family/caregiver demonstrates understanding of disease process, treatment plan, medications, and discharge instructions  Description: Complete learning assessment and assess knowledge base  Interventions:  - Provide teaching at level of understanding  - Provide teaching via preferred learning methods  Outcome: Progressing     Problem: SAFETY ADULT  Goal: Maintain or return to baseline ADL function  Description: INTERVENTIONS:  -  Assess patient's ability to carry out ADLs; assess patient's baseline for ADL function and identify physical deficits which impact ability to perform ADLs (bathing, care of mouth/teeth, toileting, grooming, dressing, etc )  - Assess/evaluate cause of self-care deficits   - Assess range of motion  - Assess patient's mobility; develop plan if impaired  - Assess patient's need for assistive devices and provide as appropriate  - Encourage maximum independence but intervene and supervise when necessary  - Involve family in performance of ADLs  - Assess for home care needs following discharge   - Consider OT consult to assist with ADL evaluation and planning for discharge  - Provide patient education as appropriate  Outcome: Progressing     Problem: SAFETY ADULT  Goal: Maintains/Returns to pre admission functional level  Description: INTERVENTIONS:  - Perform BMAT or MOVE assessment daily    - Set and communicate daily mobility goal to care team and patient/family/caregiver     - Collaborate with rehabilitation services on mobility goals if consulted  - Ambulate patient 3 times a day  - Out of bed to chair 3 times a day   - Out of bed for meals 3 times a day  - Out of bed for toileting  - Record patient progress and toleration of activity level   Outcome: Progressing

## 2022-07-18 NOTE — PROGRESS NOTES
[facility]  Progress Note - Nate Pham 2000, 23 y o  male MRN: [id number]  Unit/Bed#: [facility] 706-01 Encounter: [id number]  Primary Care Provider: Michi Magaña MD   Date and time admitted to hospital: 7/13/2022  6:44 PM    78-year-old with past medical history of depression, morbid obesity, presented with acute bilateral vision loss, some findings of workup consistent with idiopathic intracranial hypertension however there is diagnostic uncertainty given conflicting findings and neurology and ophthalmology have recommended transfer to hospital that can provide inpatient neuro ophthalmology consult and further workup  IIH (idiopathic intracranial hypertension)  Assessment & Plan  · Elevated opening pressure on LP, however no papilledema on ophthalmology funduscopic exam  · It is certainly possible this is contributing to his vision loss, however given question of diagnosis plan to transfer patient to hospital is able to provide inpatient neuro ophthalmologic evaluation      * Decreased visual acuity  Assessment & Plan  · At baseline 3 weeks ago patient reports his right eye had 25% visibility (since he was 21years old) and his left eye was normal   · Noticed decreased vision after going to gym  Also w/ left sided neck pain     · Has right apparent pupillary defect on exam  · CTH/CTA negative in the ER  · Consult Neurology, initial concern for demylenation process, now more concerning for idiopathic intracranial hypertension vs other etiology (elevated opening pressure on LP, however no papilledema on funduscopic exam causing diagnostic uncertainty)  · MRI w/ evidence of intracranial hypertension, lumbar puncture 7/14 with opening pressure 55, started on acetazolamide  · Per Neurology, repeat LP with opening pressures 7/16 as vision slightly worse per pt report, opening pressure 18  · Cont to hold DVT prophylaxis for now in case patient needs repeat emergent LP, can discuss with Neurology going forward they think this is likely or if it is okay to start DVT prophylaxis  · Follow-up extensive labs   · Given patient's visual acuity drastically decreased and he is functionally blind, will be transferring to hospital with inpatient Neuro-Ophthalmology    Acute nonintractable headache  Assessment & Plan  Resolved    BMI 50 0-59 9, adult [facility])  Assessment & Plan  · Dietary consult  · Discussed with patient that would be beneficial to establish care with primary care doctor, he has one in our system however this does not remember meeting this person    Severe episode of recurrent major depressive disorder, without psychotic features ([facility] [address] )  Assessment & Plan  · Continue Lexapro        VTE Pharmacologic Prophylaxis: VTE Score: 6 Moderate Risk (Score 3-4) - Pharmacological DVT Prophylaxis Contraindicated  Sequential Compression Devices Ordered  Patient Centered Rounds: I performed bedside rounds with nursing staff today  Discussions with Specialists or Other Care Team Provider:  Neurology, Ophthalmology    Education and Discussions with Family / Patient: Updated  (sister) via phone  Time Spent for Care: 30 minutes  More than 50% of total time spent on counseling and coordination of care as described above  Current Length of Stay: 5 day(s)  Current Patient Status: Inpatient   Certification Statement: The patient will continue to require additional inpatient hospital stay due to Needs transfer to hospital with subspecialty care (Neuro-Ophthalmology)  Discharge Plan: Anticipate discharge in 24-48 hrs to Transfer to another hospital    Code Status: Level 1 - Full Code    Subjective:   Patient overall feels okay, slightly down given no improvement in his vision, trying to contemplate life if vision is not come back but still hopeful it will return  Agreeable to transfer to hospital with Neuro-Ophthalmology capabilities after discussion for reasoning    No headache, no pain, no other acute concerns  Objective:     Vitals:   Temp (24hrs), Av 1 °F (36 7 °C), Min:98 °F (36 7 °C), Max:98 2 °F (36 8 °C)    Temp:  [98 °F (36 7 °C)-98 2 °F (36 8 °C)] 98 °F (36 7 °C)  Resp:  [20] 20  BP: (139-140)/(85-87) 140/87  Body mass index is 55 33 kg/m²  Input and Output Summary (last 24 hours): Intake/Output Summary (Last 24 hours) at 2022 1722  Last data filed at 2022 0801  Gross per 24 hour   Intake 250 ml   Output --   Net 250 ml       Physical Exam:   Physical Exam  Vitals reviewed  Constitutional:       General: He is not in acute distress  Appearance: He is not toxic-appearing  HENT:      Nose: Nose normal    Eyes:      General: No scleral icterus  Cardiovascular:      Rate and Rhythm: Normal rate and regular rhythm  Heart sounds: Normal heart sounds  Pulmonary:      Effort: Pulmonary effort is normal  No respiratory distress  Breath sounds: Normal breath sounds  Abdominal:      General: Bowel sounds are normal       Palpations: Abdomen is soft  Tenderness: There is no abdominal tenderness  Musculoskeletal:      Right lower leg: No edema  Left lower leg: No edema  Skin:     General: Skin is warm and dry  Neurological:      Mental Status: He is alert and oriented to person, place, and time  Comments: See neurology note for detailed neuro exam, patient is still unable to see how many fingers I am holding up 6 in from his face     Psychiatric:         Mood and Affect: Mood normal          Additional Data:     Labs:  Results from last 7 days   Lab Units 07/15/22  0556 22  0609   WBC Thousand/uL 10 56* 9 35   HEMOGLOBIN g/dL 17 1* 16 5   HEMATOCRIT % 50 9* 49 2   PLATELETS Thousands/uL 195 162   NEUTROS PCT %  --  70   LYMPHS PCT %  --  17   MONOS PCT %  --  10   EOS PCT %  --  1     Results from last 7 days   Lab Units 22  0540 07/15/22  0556 22  0609   SODIUM mmol/L 140   < > 139   POTASSIUM mmol/L 4 1   < > 3  8   CHLORIDE mmol/L 111*   < > 106   CO2 mmol/L 22   < > 27   BUN mg/dL 25   < > 11   CREATININE mg/dL 1 17   < > 0 81   ANION GAP mmol/L 7   < > 6   CALCIUM mg/dL 9 7   < > 9 5   ALBUMIN g/dL  --   --  3 3*   TOTAL BILIRUBIN mg/dL  --   --  0 75   ALK PHOS U/L  --   --  60   ALT U/L  --   --  44   AST U/L  --   --  22   GLUCOSE RANDOM mg/dL 99   < > 89    < > = values in this interval not displayed  Results from last 7 days   Lab Units 07/15/22  0556   INR  0 94         Results from last 7 days   Lab Units 07/14/22  0609   HEMOGLOBIN A1C % 5 4           Lines/Drains:  Invasive Devices  Report    None                       Imaging: No pertinent imaging reviewed  Recent Cultures (last 7 days):   Results from last 7 days   Lab Units 07/14/22  1031   GRAM STAIN RESULT  No No Polys or Bacteria seen       Last 24 Hours Medication List:   Current Facility-Administered Medications   Medication Dose Route Frequency Provider Last Rate    acetaZOLAMIDE  500 mg Oral Q12H Ferdinand Morales MD      clonazePAM  0 5 mg Oral HS PRN Iris Alexandra MD      escitalopram  10 mg Oral Daily Faby Tse DO      melatonin  3 mg Oral HS Mary Smith MD          Today, Patient Was Seen By: Iris Alexandra MD    **Please Note: This note may have been constructed using a voice recognition system  **

## 2022-07-19 LAB
ALB CSF/SERPL: 3 {RATIO} (ref 0–8)
ALBUMIN CSF-MCNC: 11 MG/DL (ref 10–45)
ALBUMIN SERPL-MCNC: 4.1 G/DL (ref 4.1–5.2)
B BURGDOR DNA SPEC QL NAA+PROBE: NEGATIVE
BACTERIA CSF CULT: NO GROWTH
IGG CSF-MCNC: 1.6 MG/DL (ref 0–10.3)
IGG SERPL-MCNC: 994 MG/DL (ref 603–1613)
IGG SYNTH RATE SER+CSF CALC-MRATE: -1.9 MG/DAY
IGG/ALB CLEAR SER+CSF-RTO: 0.6 (ref 0–0.7)
IGG/ALB CSF: 0.15 {RATIO} (ref 0–0.25)
MBP CSF-MCNC: 3.9 NG/ML (ref 0–3.8)
OLIGOCLONAL BANDS.IT SER+CSF QL: ABNORMAL

## 2022-07-19 PROCEDURE — 99232 SBSQ HOSP IP/OBS MODERATE 35: CPT | Performed by: PSYCHIATRY & NEUROLOGY

## 2022-07-19 PROCEDURE — 99232 SBSQ HOSP IP/OBS MODERATE 35: CPT | Performed by: INTERNAL MEDICINE

## 2022-07-19 RX ORDER — ACETAZOLAMIDE 250 MG/1
1000 TABLET ORAL EVERY 12 HOURS SCHEDULED
Status: DISCONTINUED | OUTPATIENT
Start: 2022-07-19 | End: 2022-07-20 | Stop reason: HOSPADM

## 2022-07-19 RX ADMIN — MELATONIN 3 MG: at 22:05

## 2022-07-19 RX ADMIN — ACETAZOLAMIDE 500 MG: 250 TABLET ORAL at 09:37

## 2022-07-19 RX ADMIN — ESCITALOPRAM OXALATE 10 MG: 10 TABLET ORAL at 09:37

## 2022-07-19 RX ADMIN — ACETAZOLAMIDE 1000 MG: 250 TABLET ORAL at 22:05

## 2022-07-19 NOTE — PROGRESS NOTES
NEUROLOGY RESIDENCY PROGRESS NOTE     Name: Margarita Keane   Age & Sex: 23 y o  male   MRN: [id number]  Unit/Bed#: [facility] 706-01   Encounter: [id number]      ASSESSMENT & PLAN     IIH (idiopathic intracranial hypertension)  Assessment & Plan  Assessment:  Patient is a 23 YOM w/ limited PMH on no medications who p/w binocular vision loss for approximately 4-5 days preceded by left shoulder pain that radiated up the lateral neck and posterior skull to apex of head that started after working out  Visual deficit started as blurring w/ a feeling of eye irritation but no overt pain  At this time, vision remains with no significant improvement despite 2 LPs, second one showing nrml pressure  Unclear if he would benefit from Optic Nerve Sheath Fenestration  Recommend being evaluated by Neuroopthamology at [facility]  See attending attestation for further details  Workup:    CTH/CTA: negative for acute blood, signs of ischemia, LVO or critical stenosis and no vertebral defect concerning for dissection   MRI brain and orbit w/wo results: showed evidence of intracranial HTN with no evidence of demyelinating disease   MRV brain and orbit: significant stenosis of bilateral dural venous sinuses with no evidence of venous thrombosis   Labs: ESR: 25, CRP 24 7   CSF labs:   o lactic, protein, glucose, Meningitis labs normal, negative gram stain  o 07/14 S/p IR LP opening pressure: 55  o 07/16 s/p repeat IR LP opening pressure: 18    Plan:  - Increase Diamox to 1,000 mg q12h  - Order TSH with T4   - CSF NMO AiG Autoantibodies sent t out to [facility] - Results pending  - Recommend following up with [facility] Department in [place] for Neurophthalmology evaluation      SUBJECTIVE     Patient was seen and examined  No acute events overnight  Patient reports no return of vision since initial presentation noting persistent loss of vision without any improvement  Pt states post LP he symptoms did not improve  Review of Systems   Constitutional: Negative for activity change and fever  HENT: Negative for hearing loss, trouble swallowing and voice change  Eyes: Negative for photophobia and pain  Positive for b/l visual loss     Respiratory: Negative for cough and shortness of breath  Cardiovascular: Negative for chest pain and palpitations  Endocrine: Negative for cold intolerance and heat intolerance  Musculoskeletal: Negative for myalgias and neck pain  Skin: Negative for color change and rash  Neurological: Negative for dizziness, syncope, speech difficulty and headaches  Psychiatric/Behavioral: Negative for agitation and confusion  OBJECTIVE     Patient ID: Christal Flor is a 23 y o  male  Vitals:    22 1519 22 2149 22 0719 22 1523   BP: 140/87 131/65 129/84 126/61   Pulse:       Resp: 20 20  20   Temp: 98 °F (36 7 °C) 98 2 °F (36 8 °C) 97 9 °F (36 6 °C) 98 3 °F (36 8 °C)   TempSrc:       SpO2:       Weight:          Temperature:   Temp (24hrs), Av 1 °F (36 7 °C), Min:97 9 °F (36 6 °C), Max:98 3 °F (36 8 °C)    Temperature: 98 3 °F (36 8 °C)      Physical Exam  Vitals reviewed  Constitutional:       Appearance: Normal appearance  HENT:      Head: Normocephalic and atraumatic  Nose: Nose normal    Eyes:      Extraocular Movements: Extraocular movements intact and EOM normal       Conjunctiva/sclera: Conjunctivae normal       Pupils: Pupils are equal, round, and reactive to light  Cardiovascular:      Rate and Rhythm: Normal rate and regular rhythm  Pulmonary:      Effort: Pulmonary effort is normal  No respiratory distress  Musculoskeletal:         General: No deformity  Normal range of motion  Cervical back: Normal range of motion and neck supple  Skin:     Coloration: Skin is not pale  Neurological:      Mental Status: He is alert and oriented to person, place, and time  Gait: Gait is intact   Gait normal       Deep Tendon Reflexes: Reflexes normal    Psychiatric:         Mood and Affect: Mood normal          Speech: Speech normal          Behavior: Behavior normal           Neurologic Exam     Mental Status   Oriented to person, place, and time  Speech: speech is normal   Level of consciousness: alert    Cranial Nerves     CN II   Visual acuity: decreased (decreased central visual acuity of both the left and right eye with minimal retained peripheral vision b/l)    CN III, IV, VI   Pupils are equal, round, and reactive to light  Extraocular motions are normal    Right pupil: Shape: regular  Consensual response: intact  Left pupil: Shape: regular  Consensual response: intact  Nystagmus: none   Diplopia: none  Upgaze: normal  Downgaze: normal    CN V   Facial sensation intact  CN VII   Facial expression full, symmetric  CN VIII   CN VIII normal      CN IX, X   CN IX normal      CN XI   CN XI normal      CN XII   CN XII normal      Motor Exam   Muscle bulk: normal  Overall muscle tone: normal    Sensory Exam   Light touch normal      Gait, Coordination, and Reflexes     Gait  Gait: normal     LABORATORY DATA     Labs: I have personally reviewed pertinent reports      Results from last 7 days   Lab Units 07/15/22  0556 07/14/22  0609 07/13/22  1951   WBC Thousand/uL 10 56* 9 35 10 57*   HEMOGLOBIN g/dL 17 1* 16 5 18 2*   HEMATOCRIT % 50 9* 49 2 52 7*   PLATELETS Thousands/uL 195 162 178   NEUTROS PCT %  --  70 77*   MONOS PCT %  --  10 8      Results from last 7 days   Lab Units 07/16/22  0540 07/15/22  0556 07/14/22  0609   SODIUM mmol/L 140 141 139   POTASSIUM mmol/L 4 1 3 8 3 8   CHLORIDE mmol/L 111* 109* 106   CO2 mmol/L 22 22 27   BUN mg/dL 25 22 11   CREATININE mg/dL 1 17 1 07 0 81   CALCIUM mg/dL 9 7 9 5 9 5   ALK PHOS U/L  --   --  60   ALT U/L  --   --  44   AST U/L  --   --  22     Results from last 7 days   Lab Units 07/14/22  0609   MAGNESIUM mg/dL 1 8          Results from last 7 days   Lab Units 07/15/22  0556 07/14/22  0609   INR  0 94 1 00   PTT seconds  --  27               IMAGING & DIAGNOSTIC TESTING     Radiology Results: I have personally reviewed pertinent reports  IR lumbar puncture   Final Result by Andres Boyce MD (07/18 9472)      Fluoroscopic-guided diagnostic lumbar puncture      Considering the decreased opening pressure compared to last time, and slow CSF drainage, only 11cc was removed  Closing pressure less than 15cm H20, exact measurement unable to obtained as the patient was prone and fluid no longer coming out of needle, which was 15cm in length  This was discussed with neurology  Workstation performed: [id number]         MRV head wo contrast   Final Result by Alexia Avina MD (07/15 0901)      No evidence of dural venous sinus thrombosis  Severe stenosis in bilateral transverse-sigmoid dural venous sinus junction  Workstation performed: [id number]         [facility] IN-patient lumbar puncture   Final Result by Richard Jo MD (07/14 3966)      Successful fluoroscopically guided lumbar puncture with an opening pressure of 55 cm H2O  Approximately 25 mL's of clear colorless CSF was removed and sent to lab for requested analysis  Closing pressure was 10 cm H2O  I reviewed the above findings and procedure with Dr Guru Keith  PERFORMED, DICTATED AND SIGNED BY: Kian Urena PA-C      Workstation performed: [id number]         MRI brain and orbits wo and w contrast   Final Result by Ander Nash MD (07/14 0874)      1  MRI evidences suggesting intracranial hypertension  Recommend lumbar puncture for further evaluation/confirmation  2   No evidence of optic neuritis        3   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             Workstation performed: [id number]         CTA head and neck with and without contrast   Final Result by Alvaro Rodriguez MD (07/13 1441)      No stenosis, dissection or occlusion of the carotid or vertebral arteries or major vessels of the [place]  Workstation performed: [id number]         CT head wo contrast   Final Result by Chris Fields DO (07/13 2014)      No acute intracranial abnormality  Workstation performed: [id number]             Other Diagnostic Testing: I have personally reviewed pertinent reports  ACTIVE MEDICATIONS     Current Facility-Administered Medications   Medication Dose Route Frequency    acetaZOLAMIDE (DIAMOX) tablet 1,000 mg  1,000 mg Oral Q12H [address]    clonazePAM (KlonoPIN) tablet 0 5 mg  0 5 mg Oral HS PRN    escitalopram (LEXAPRO) tablet 10 mg  10 mg Oral Daily    melatonin tablet 3 mg  3 mg Oral HS       Prior to Admission medications    Medication Sig Start Date End Date Taking?  Authorizing Provider   cyclobenzaprine (FLEXERIL) 10 mg tablet Take 1 tablet (10 mg total) by mouth 2 (two) times a day as needed for muscle spasms 7/7/22   Leyda Wells PA-C   escitalopram (LEXAPRO) 10 mg tablet Take 1 tablet by mouth daily [facility] 6/2/17   Gian Frederick   ketorolac (TORADOL) 10 mg tablet Take 1 tablet (10 mg total) by mouth every 6 (six) hours as needed for moderate pain 7/7/22   [employer] MINNIE Wells PA-C   naproxen (NAPROSYN) 500 mg tablet Take 1 tablet (500 mg total) by mouth 2 (two) times a day with meals for 7 days 7/29/20 8/5/20  Fede Hawkins PA-C     ==  DO Bry [facility], PGY-1

## 2022-07-19 NOTE — ASSESSMENT & PLAN NOTE
· Dietary consulted  · Discussed with patient that would be beneficial to establish care with primary care doctor  Weight loss and lifestyle modification counseled

## 2022-07-19 NOTE — ASSESSMENT & PLAN NOTE
· At baseline 3 weeks ago patient reports his right eye had 25% visibility (since he was 21years old) and his left eye was normal   · Noticed decreased vision after going to gym  Also w/ left sided neck pain  · Has right apparent pupillary defect on exam  · CTH/CTA negative in the ER  · Consult Neurology, initial concern for demylenation process, now more concerning for idiopathic intracranial hypertension vs other etiology (elevated opening pressure on LP, however no papilledema on funduscopic exam causing diagnostic uncertainty)  · MRI w/ evidence of intracranial hypertension, lumbar puncture 7/14 with opening pressure 55, started on acetazolamide  · Per Neurology, repeat LP with opening pressures 7/16 as vision slightly worse per pt report, opening pressure 18  · Ofpthalmology consult appreciated  · Given his worsening visual acuity and uncertainty of diagnosis patient was planned to be transferred to another center with inpatient neuro ophthalmology service  As per transfer center patient was declined at [facility] [place] [facility]  ·  I spoke with Dr Derik Joseph, ophthalmologist at [facility] stated the even though patient can be  transferred to inpatient at their center, not sure if they could offer more detail exam than what has been done here, suggested to send patient to [facility] ER from where they can decide if libby needs in patietn work up/ admission or out pt follow up  · D/W our neurology dept- who agreed with the plan, called patient's sister who stated that she will be able to take him to [facility] ER  libby stated his brother will be picking him up today to take him to [facility] ER today   D/w care center

## 2022-07-19 NOTE — RESTORATIVE TECHNICIAN NOTE
Restorative Technician Note      Patient Name: Willem Capone     Note Type: Mobility  Patient Position Upon Consult: Supine  Activity Performed: Ambulated; Dangled; Stood  Assistive Device: Other (Comment) (none)  Education Provided: Yes  Patient Position at End of Consult: All needs within reach;  Supine      Jw ROGER, Restorative Technician, [employer]

## 2022-07-19 NOTE — PLAN OF CARE
Problem: PAIN - ADULT  Goal: Verbalizes/displays adequate comfort level or baseline comfort level  Description: Interventions:  - Encourage patient to monitor pain and request assistance  - Assess pain using appropriate pain scale  - Administer analgesics based on type and severity of pain and evaluate response  - Implement non-pharmacological measures as appropriate and evaluate response  - Consider cultural and social influences on pain and pain management  - Notify physician/advanced practitioner if interventions unsuccessful or patient reports new pain  Outcome: Progressing     Problem: SAFETY ADULT  Goal: Patient will remain free of falls  Description: INTERVENTIONS:  - Educate patient/family on patient safety including physical limitations  - Instruct patient to call for assistance with activity   - Consult OT/PT to assist with strengthening/mobility   - Keep Call bell within reach  - Keep bed low and locked with side rails adjusted as appropriate  - Keep care items and personal belongings within reach  - Initiate and maintain comfort rounds  - Make Fall Risk Sign visible to staff  - Offer Toileting every 2 Hours, in advance of need  - Initiate/Maintain 2alarm  - Obtain necessary fall risk management equipment: 2  - Apply yellow socks and bracelet for high fall risk patients  - Consider moving patient to room near nurses station  Outcome: Progressing  Goal: Maintain or return to baseline ADL function  Description: INTERVENTIONS:  -  Assess patient's ability to carry out ADLs; assess patient's baseline for ADL function and identify physical deficits which impact ability to perform ADLs (bathing, care of mouth/teeth, toileting, grooming, dressing, etc )  - Assess/evaluate cause of self-care deficits   - Assess range of motion  - Assess patient's mobility; develop plan if impaired  - Assess patient's need for assistive devices and provide as appropriate  - Encourage maximum independence but intervene and supervise when necessary  - Involve family in performance of ADLs  - Assess for home care needs following discharge   - Consider OT consult to assist with ADL evaluation and planning for discharge  - Provide patient education as appropriate  Outcome: Progressing  Goal: Maintains/Returns to pre admission functional level  Description: INTERVENTIONS:  - Perform BMAT or MOVE assessment daily    - Set and communicate daily mobility goal to care team and patient/family/caregiver  - Collaborate with rehabilitation services on mobility goals if consulted  - Perform Range of Motion 2 times a day  - Reposition patient every 2 hours  - Dangle patient 2 times a day  - Stand patient 2 times a day  - Ambulate patient 2 times a day  - Out of bed to chair 2 times a day   - Out of bed for meals 2 times a day  - Out of bed for toileting  - Record patient progress and toleration of activity level   Outcome: Progressing     Problem: DISCHARGE PLANNING  Goal: Discharge to home or other facility with appropriate resources  Description: INTERVENTIONS:  - Identify barriers to discharge w/patient and caregiver  - Arrange for needed discharge resources and transportation as appropriate  - Identify discharge learning needs (meds, wound care, etc )  - Arrange for interpretive services to assist at discharge as needed  - Refer to Case Management Department for coordinating discharge planning if the patient needs post-hospital services based on physician/advanced practitioner order or complex needs related to functional status, cognitive ability, or social support system  Outcome: Progressing     Problem: Knowledge Deficit  Goal: Patient/family/caregiver demonstrates understanding of disease process, treatment plan, medications, and discharge instructions  Description: Complete learning assessment and assess knowledge base    Interventions:  - Provide teaching at level of understanding  - Provide teaching via preferred learning methods  Outcome: Progressing     Problem: Potential for Falls  Goal: Patient will remain free of falls  Description: INTERVENTIONS:  - Educate patient/family on patient safety including physical limitations  - Instruct patient to call for assistance with activity   - Consult OT/PT to assist with strengthening/mobility   - Keep Call bell within reach  - Keep bed low and locked with side rails adjusted as appropriate  - Keep care items and personal belongings within reach  - Initiate and maintain comfort rounds  - Make Fall Risk Sign visible to staff  - Offer Toileting every 2 Hours, in advance of need  - Initiate/Maintain 2alarm  - Obtain necessary fall risk management equipment: 2  - Apply yellow socks and bracelet for high fall risk patients  - Consider moving patient to room near nurses station  Outcome: Progressing

## 2022-07-19 NOTE — ASSESSMENT & PLAN NOTE
· Elevated opening pressure on LP, however no papilledema on ophthalmology funduscopic exam  It is certainly possible this is contributing to his vision loss, however given question of diagnosis- needs neuro-ophthalmology eval at [facility] - pt will need to go to ER  Neurology follow-up appreciated recommended increasing dose of acetazolamide to 1000 b i d

## 2022-07-19 NOTE — PROGRESS NOTES
[facility] [facility]  Progress Note - Marielle Jo 2000, 23 y o  male MRN: [id number]  [facility] Encounter: [id number]  Primary Care Provider: Jorge Alberto Moran MD   Date and time admitted to hospital: 7/13/2022  6:44 PM      * Decreased visual acuity  Assessment & Plan  · At baseline 3 weeks ago patient reports his right eye had 25% visibility (since he was 21years old) and his left eye was normal   · Noticed decreased vision after going to gym  Also w/ left sided neck pain  · Has right apparent pupillary defect on exam  · CTH/CTA negative in the ER  · Consult Neurology, initial concern for demylenation process, now more concerning for idiopathic intracranial hypertension vs other etiology (elevated opening pressure on LP, however no papilledema on funduscopic exam causing diagnostic uncertainty)  · MRI w/ evidence of intracranial hypertension, lumbar puncture 7/14 with opening pressure 55, started on acetazolamide  · Per Neurology, repeat LP with opening pressures 7/16 as vision slightly worse per pt report, opening pressure 18  · Ofpthalmology consult appreciated  · Given his worsening visual acuity and uncertainty of diagnosis patient was planned to be transferred to another center with inpatient neuro ophthalmology service  As per transfer center patient was declined [facility]  · Today, I spoke with Dr Jose Hopkins, ophthalmologist at [facility] stated the even though patient can be  transferred to inpatient at their center, not sure if they could offer more detail exam than what has been done here, suggested to send patient to [facility] ER from where they can decide if patietn needs in patietn work up/ admission or out pt follow up    · D/W our neurology dept- who agreed with the plan, called patient's sister who stated that she will be able to take him to [facility] ER tomorrow after 3pm  D/w care center     II (idiopathic intracranial hypertension)  Assessment & Plan  · Elevated opening pressure on LP, however no papilledema on ophthalmology funduscopic exam  It is certainly possible this is contributing to his vision loss, however given question of diagnosis- needs neuro-ophthalmology eval at [facility] - pt will need to go to ER  Neurology follow-up appreciated recommended increasing dose of acetazolamide to 1000 b i d  Acute nonintractable headache  Assessment & Plan  Resolved    BMI 50 0-59 9, adult [facility])  Assessment & Plan  · Dietary consult  · Discussed with patient that would be beneficial to establish care with primary care doctor, he has one in our system however this does not remember meeting this person    Severe episode of recurrent major depressive disorder, without psychotic features ([facility] [address] )  Assessment & Plan  · Continue Lexapro      VTE Pharmacologic Prophylaxis: VTE Score: 6 Moderate Risk (Score 3-4) - Pharmacological DVT Prophylaxis Contraindicated  Sequential Compression Devices Ordered  Patient Centered Rounds: I performed bedside rounds with nursing staff today  Discussions with Specialists or Other Care Team Provider:  Neurology, Ophthalmology    Education and Discussions with Family / Patient: Updated  (sister) via phone  Detail discussion  She will take patietn to [facility] er tomorrow    Time Spent for Care: 30 minutes  More than 50% of total time spent on counseling and coordination of care as described above  Current Length of Stay: 6 day(s)  Current Patient Status: Inpatient   Certification Statement: The patient will continue to require additional inpatient hospital stay due to need to take to [facility] ER for subspecialty care (Neuro-Ophthalmology)  Discharge Plan: Anticipate discharge in tomorrow when sister can take him l    Code Status: Level 1 - Full Code    Subjective:     Patient is lying in bed, other than his vision problem he denies any new complaints    He denies any headache, nausea, vomiting  Denies any chest pain  I explained to him that transfer to another hospital has not been successful and that he will have to go to [facility] ER  He agrees with the plan  Objective:     Vitals:   Temp (24hrs), Av 1 °F (36 7 °C), Min:97 9 °F (36 6 °C), Max:98 3 °F (36 8 °C)    Temp:  [97 9 °F (36 6 °C)-98 3 °F (36 8 °C)] 98 3 °F (36 8 °C)  Resp:  [20] 20  BP: (126-131)/(61-84) 126/61  Body mass index is 55 33 kg/m²  Input and Output Summary (last 24 hours):   No intake or output data in the 24 hours ending 22    Physical Exam:   Physical Exam  Vitals reviewed  Constitutional:       General: He is not in acute distress  Appearance: He is obese  He is not toxic-appearing  HENT:      Nose: Nose normal    Eyes:      General: No scleral icterus  Cardiovascular:      Rate and Rhythm: Normal rate and regular rhythm  Heart sounds: Normal heart sounds  Pulmonary:      Effort: Pulmonary effort is normal  No respiratory distress  Breath sounds: Normal breath sounds  Abdominal:      General: Bowel sounds are normal       Palpations: Abdomen is soft  Tenderness: There is no abdominal tenderness  Musculoskeletal:      Right lower leg: No edema  Left lower leg: No edema  Skin:     General: Skin is warm and dry  Neurological:      Mental Status: He is alert and oriented to person, place, and time        Comments: Unable to see, he can make out my face and hand shadow but cannot count how many fingers   Psychiatric:         Mood and Affect: Mood normal          Additional Data:     Labs:  Results from last 7 days   Lab Units 07/15/22  0556 22  0609   WBC Thousand/uL 10 56* 9 35   HEMOGLOBIN g/dL 17 1* 16 5   HEMATOCRIT % 50 9* 49 2   PLATELETS Thousands/uL 195 162   NEUTROS PCT %  --  70   LYMPHS PCT %  --  17   MONOS PCT %  --  10   EOS PCT %  --  1     Results from last 7 days   Lab Units 22  0540 07/15/22  0556 07/14/22  1031 07/14/22  0609   SODIUM mmol/L 140   < >  --  139   POTASSIUM mmol/L 4 1   < >  --  3 8   CHLORIDE mmol/L 111*   < >  --  106   CO2 mmol/L 22   < >  --  27   BUN mg/dL 25   < >  --  11   CREATININE mg/dL 1 17   < >  --  0 81   ANION GAP mmol/L 7   < >  --  6   CALCIUM mg/dL 9 7   < >  --  9 5   ALBUMIN g/dL  --   --  4 1 3 3*   TOTAL BILIRUBIN mg/dL  --   --   --  0 75   ALK PHOS U/L  --   --   --  60   ALT U/L  --   --   --  44   AST U/L  --   --   --  22   GLUCOSE RANDOM mg/dL 99   < >  --  89    < > = values in this interval not displayed  Results from last 7 days   Lab Units 07/15/22  0556   INR  0 94         Results from last 7 days   Lab Units 07/14/22  0609   HEMOGLOBIN A1C % 5 4           Lines/Drains:  Invasive Devices  Report    None                       Imaging: No pertinent imaging reviewed  Recent Cultures (last 7 days):   Results from last 7 days   Lab Units 07/14/22  1031   GRAM STAIN RESULT  No No Polys or Bacteria seen       Last 24 Hours Medication List:   Current Facility-Administered Medications   Medication Dose Route Frequency Provider Last Rate    acetaZOLAMIDE  1,000 mg Oral Q12H Mtat Andrade MD      clonazePAM  0 5 mg Oral HS PRN Evaristo Dakins, MD      escitalopram  10 mg Oral Daily Humaira Cash DO      melatonin  3 mg Oral HS Mary Davis MD          Today, Patient Was Seen By: Zakia Sosa MD    **Please Note: This note may have been constructed using a voice recognition system  **

## 2022-07-20 VITALS
RESPIRATION RATE: 18 BRPM | WEIGHT: 315 LBS | HEART RATE: 71 BPM | DIASTOLIC BLOOD PRESSURE: 72 MMHG | SYSTOLIC BLOOD PRESSURE: 127 MMHG | BODY MASS INDEX: 55.33 KG/M2 | TEMPERATURE: 98.5 F | OXYGEN SATURATION: 94 %

## 2022-07-20 LAB — JCPYV DNA CSF QL NAA+PROBE: NEGATIVE

## 2022-07-20 PROCEDURE — 99239 HOSP IP/OBS DSCHRG MGMT >30: CPT | Performed by: INTERNAL MEDICINE

## 2022-07-20 RX ORDER — ACETAZOLAMIDE 250 MG/1
1000 TABLET ORAL EVERY 12 HOURS SCHEDULED
Qty: 60 TABLET | Refills: 0 | Status: SHIPPED | OUTPATIENT
Start: 2022-07-20

## 2022-07-20 RX ORDER — ACETAMINOPHEN 325 MG/1
650 TABLET ORAL EVERY 6 HOURS PRN
Status: DISCONTINUED | OUTPATIENT
Start: 2022-07-20 | End: 2022-07-20 | Stop reason: HOSPADM

## 2022-07-20 RX ORDER — ACETAMINOPHEN 325 MG/1
650 TABLET ORAL EVERY 6 HOURS PRN
Qty: 20 TABLET | Refills: 0 | Status: SHIPPED | OUTPATIENT
Start: 2022-07-20 | End: 2022-07-21

## 2022-07-20 RX ADMIN — ACETAMINOPHEN 650 MG: 325 TABLET, FILM COATED ORAL at 11:42

## 2022-07-20 RX ADMIN — ACETAZOLAMIDE 1000 MG: 250 TABLET ORAL at 10:54

## 2022-07-20 RX ADMIN — ESCITALOPRAM OXALATE 10 MG: 10 TABLET ORAL at 10:54

## 2022-07-20 NOTE — DISCHARGE SUMMARY
[address] [facility]  Discharge- Darling Genre 2000, 23 y o  male MRN: [id number]  Unit/Bed#: [facility] 706-01 Encounter: [id number]  Primary Care Provider: Brice Davis MD   Date and time admitted to hospital: 7/13/2022  6:44 PM    * Decreased visual acuity  Assessment & Plan  · At baseline 3 weeks ago patient reports his right eye had 25% visibility (since he was 21years old) and his left eye was normal   · Noticed decreased vision after going to gym  Also w/ left sided neck pain  · Has right apparent pupillary defect on exam  · CTH/CTA negative in the ER  · Consult Neurology, initial concern for demylenation process, now more concerning for idiopathic intracranial hypertension vs other etiology (elevated opening pressure on LP, however no papilledema on funduscopic exam causing diagnostic uncertainty)  · MRI w/ evidence of intracranial hypertension, lumbar puncture 7/14 with opening pressure 55, started on acetazolamide  · Per Neurology, repeat LP with opening pressures 7/16 as vision slightly worse per pt report, opening pressure 18  · Ofpthalmology consult appreciated  · Given his worsening visual acuity and uncertainty of diagnosis patient was planned to be transferred to another center with inpatient neuro ophthalmology service  As per transfer center patient was declined at [facility] [place]  ·  I spoke with Dr Kailee Redman, ophthalmologist at [facility] stated the even though patient can be  transferred to inpatient at their center, not sure if they could offer more detail exam than what has been done here, suggested to send patient to [facility] ER from where they can decide if libby needs in patietn work up/ admission or out pt follow up    · D/W our neurology dept- who agreed with the plan, called patient's sister who stated that she will be able to take him to [facility] ER  libby stated his brother will be picking him up today to take him to [facility] ER today  D/w care center     IIH (idiopathic intracranial hypertension)  Assessment & Plan  · Elevated opening pressure on LP, however no papilledema on ophthalmology funduscopic exam  It is certainly possible this is contributing to his vision loss, however given question of diagnosis- needs neuro-ophthalmology eval at [facility] - pt will need to go to ER  Neurology follow-up appreciated recommended increasing dose of acetazolamide to 1000 b i d  Acute nonintractable headache  Assessment & Plan  Resolved    BMI 50 0-59 9, adult [facility])  Assessment & Plan  · Dietary consulted  · Discussed with patient that would be beneficial to establish care with primary care doctor  Weight loss and lifestyle modification counseled  Severe episode of recurrent major depressive disorder, without psychotic features ([address] )  Assessment & Plan  · Continue Lexapro            Medical Problems             Resolved Problems  Date Reviewed: 7/20/2022   None                 Admission Date:   Admission Orders (From admission, onward)     Ordered        07/13/22 2257  INPATIENT ADMISSION  Once                        Admitting Diagnosis: Eye problem [H57 9]  Decreased visual acuity [H54 7]  Acute nonintractable headache, unspecified headache type [R51 9]    HPI: Tammy Moeller is a 23 y o  male who has past medical history significant for depression as well as partial right eye blindness and obesity who presented to [facility] on the evening of 7/13 with worsening vision loss over the last 2 weeks  Patient reports that he 1st had issues with vision loss back when he as 17 and had a similar episode to what occurred 2 weeks ago and lost 75% of his vision in his right eye  Patient reports that he has only had about 25% vision in his right eye since then  He reports his left eye continued to have 100% disability  Patient denies ever seeking medical attention for this    Patient then reports going to the gym and lifting weights 2 weeks ago and then waking up the next morning and having only 10% vision in his right eye and now only 25 present vision in his left eye  Patient also reports left-sided neck pain that radiates to the back of his head  Patient denies any weakness in the face or in any of the extremities or any sensory abnormalities in the extremities or on the face  Patient denies any chest pain or shortness of breath  Patient was asked about his family history of any similar symptoms and he reports that he was not aware of any        Procedures Performed:  Lumbar puncture    Summary of Hospital Course:  Please refer above  Patient will be picked up by family members today to take him to [facility] ER for further workup  Condition at Discharge: stable         Discharge instructions/Information to patient and family:   See after visit summary for information provided to patient and family  Provisions for Follow-Up Care:  See after visit summary for information related to follow-up care and any pertinent home health orders  PCP: Britt Olson MD    Disposition: Home    Planned Readmission: No    Discharge Statement   I spent 35 minutes discharging the patient  This time was spent on the day of discharge  I had direct contact with the patient on the day of discharge  Additional documentation is required if more than 30 minutes were spent on discharge  Discharge Medications:  See after visit summary for reconciled discharge medications provided to patient and family

## 2022-07-20 NOTE — RESTORATIVE TECHNICIAN NOTE
Restorative Technician Note      Patient Name: Lilia Birmingham     Note Type: Mobility  Patient Position Upon Consult: Bedside chair  Activity Performed: Ambulated; Dangled; Stood  Assistive Device: Other (Comment) (Assist x1, pt c/o R foot pain RN Hayley aware )  Education Provided: Yes  Patient Position at End of Consult: Bedside chair;  All needs within reach    Ricky Ann BS, Restorative Technician, [employer]

## 2022-07-20 NOTE — PLAN OF CARE
Problem: PAIN - ADULT  Goal: Verbalizes/displays adequate comfort level or baseline comfort level  Description: Interventions:  - Encourage patient to monitor pain and request assistance  - Assess pain using appropriate pain scale  - Administer analgesics based on type and severity of pain and evaluate response  - Implement non-pharmacological measures as appropriate and evaluate response  - Consider cultural and social influences on pain and pain management  - Notify physician/advanced practitioner if interventions unsuccessful or patient reports new pain  Outcome: Progressing     Problem: SAFETY ADULT  Goal: Patient will remain free of falls  Description: INTERVENTIONS:  - Educate patient/family on patient safety including physical limitations  - Instruct patient to call for assistance with activity   - Consult OT/PT to assist with strengthening/mobility   - Keep Call bell within reach  - Keep bed low and locked with side rails adjusted as appropriate  - Keep care items and personal belongings within reach  - Initiate and maintain comfort rounds  - Make Fall Risk Sign visible to staff  - Offer Toileting every 2 Hours, in advance of need  - Initiate/Maintain 2alarm  - Obtain necessary fall risk management equipment: 2  - Apply yellow socks and bracelet for high fall risk patients  - Consider moving patient to room near nurses station  Outcome: Progressing  Goal: Maintain or return to baseline ADL function  Description: INTERVENTIONS:  -  Assess patient's ability to carry out ADLs; assess patient's baseline for ADL function and identify physical deficits which impact ability to perform ADLs (bathing, care of mouth/teeth, toileting, grooming, dressing, etc )  - Assess/evaluate cause of self-care deficits   - Assess range of motion  - Assess patient's mobility; develop plan if impaired  - Assess patient's need for assistive devices and provide as appropriate  - Encourage maximum independence but intervene and supervise when necessary  - Involve family in performance of ADLs  - Assess for home care needs following discharge   - Consider OT consult to assist with ADL evaluation and planning for discharge  - Provide patient education as appropriate  Outcome: Progressing  Goal: Maintains/Returns to pre admission functional level  Description: INTERVENTIONS:  - Perform BMAT or MOVE assessment daily    - Set and communicate daily mobility goal to care team and patient/family/caregiver  - Collaborate with rehabilitation services on mobility goals if consulted  - Perform Range of Motion 2 times a day  - Reposition patient every 2 hours  - Dangle patient 2 times a day  - Stand patient 2 times a day  - Ambulate patient 2 times a day  - Out of bed to chair 2 times a day   - Out of bed for meals 2 times a day  - Out of bed for toileting  - Record patient progress and toleration of activity level   Outcome: Progressing     Problem: DISCHARGE PLANNING  Goal: Discharge to home or other facility with appropriate resources  Description: INTERVENTIONS:  - Identify barriers to discharge w/patient and caregiver  - Arrange for needed discharge resources and transportation as appropriate  - Identify discharge learning needs (meds, wound care, etc )  - Arrange for interpretive services to assist at discharge as needed  - Refer to Case Management Department for coordinating discharge planning if the patient needs post-hospital services based on physician/advanced practitioner order or complex needs related to functional status, cognitive ability, or social support system  Outcome: Progressing     Problem: Knowledge Deficit  Goal: Patient/family/caregiver demonstrates understanding of disease process, treatment plan, medications, and discharge instructions  Description: Complete learning assessment and assess knowledge base    Interventions:  - Provide teaching at level of understanding  - Provide teaching via preferred learning methods  Outcome: Progressing     Problem: Potential for Falls  Goal: Patient will remain free of falls  Description: INTERVENTIONS:  - Educate patient/family on patient safety including physical limitations  - Instruct patient to call for assistance with activity   - Consult OT/PT to assist with strengthening/mobility   - Keep Call bell within reach  - Keep bed low and locked with side rails adjusted as appropriate  - Keep care items and personal belongings within reach  - Initiate and maintain comfort rounds  - Make Fall Risk Sign visible to staff  - Offer Toileting every 2 Hours, in advance of need  - Initiate/Maintain 2 alarm  - Obtain necessary fall risk management equipment: 2   - Apply yellow socks and bracelet for high fall risk patients  - Consider moving patient to room near nurses station  Outcome: Progressing

## 2022-07-21 NOTE — UTILIZATION REVIEW
Notification of Discharge   This is a Notification of Discharge from our facility [address]  Please be advised that this patient has been discharge from our facility  Below you will find the admission and discharge date and time including the patients disposition  UTILIZATION REVIEW CONTACT:  Garfield Wilson  Utilization   Network Utilization Review Department  Phone: [phone] x carefully listen to the prompts  All voicemails are confidential   Email: [email or web address]     PHYSICIAN ADVISORY SERVICES:  FOR VCSW-LY-OUJL REVIEW - MEDICAL NECESSITY DENIAL  Phone: [phone]  Fax: [phone]  Email: [email or web address]     PRESENTATION DATE: 7/13/2022  6:44 PM  OBERVATION ADMISSION DATE:   INPATIENT ADMISSION DATE: 7/13/22 10:57 PM   DISCHARGE DATE: 7/20/2022  5:48 PM  DISPOSITION: Home/Self Care Home/Self Care      IMPORTANT INFORMATION:  Send all requests for admission clinical reviews, approved or denied determinations and any other requests to dedicated fax number below belonging to the campus where the patient is receiving treatment   List of dedicated fax numbers:  [address] DENIALS (Administrative/Medical Necessity) [phone]   [address] (Maternity/NICU/Pediatrics) [phone]   [facility] [phone]   130 [facility] [phone]   [address] [place] [address] [phone]   [address] [phone]   [facility]  [phone]   [address] [place] [address], [facility]  2401 [facility] 1000 [facility] [phone]

## 2022-07-22 ENCOUNTER — TELEPHONE (OUTPATIENT)
Dept: NEUROLOGY | Facility: CLINIC | Age: 22
End: 2022-07-22

## 2022-07-22 NOTE — TELEPHONE ENCOUNTER
HFU Scheduling - patient scheduled with Henry Mathias [place] 9/30 at [facility] - letter sent (also explained he will need to change his insurance if he wants to continue followup)  HFU- [facility] - 7/13 to 7/20 - Idiopathic intracranial Hypertension/visual deficit - [employer]      Orpamarilee Yeager will need follow up in in 4 weeks with general Neurology attending/resident or AP  He will not require outpatient neurological testing

## 2022-07-26 LAB — AQP4 H2O CHANNEL IGG CSF QL: NORMAL

## 2022-07-28 LAB — ANTINUCLEAR AB IGG ELISA FLUID: NEGATIVE EU/ML

## 2022-08-15 LAB — FUNGUS SPEC CULT: NORMAL

## 2022-08-25 ENCOUNTER — TELEPHONE (OUTPATIENT)
Dept: NEUROLOGY | Facility: CLINIC | Age: 22
End: 2022-08-25

## 2022-09-19 ENCOUNTER — TELEPHONE (OUTPATIENT)
Dept: NEUROLOGY | Facility: CLINIC | Age: 22
End: 2022-09-19

## 2023-08-15 ENCOUNTER — HOSPITAL ENCOUNTER (EMERGENCY)
Facility: HOSPITAL | Age: 23
Discharge: HOME | End: 2023-08-16
Attending: EMERGENCY MEDICINE
Payer: COMMERCIAL

## 2023-08-15 DIAGNOSIS — G93.2 IIH (IDIOPATHIC INTRACRANIAL HYPERTENSION): ICD-10-CM

## 2023-08-15 DIAGNOSIS — G43.909 MIGRAINE HEADACHE: Primary | ICD-10-CM

## 2023-08-15 PROCEDURE — 99284 EMERGENCY DEPT VISIT MOD MDM: CPT

## 2023-08-15 NOTE — Clinical Note
Date: 8/16/2023  Patient: Bruno Brink  Admitted: 8/15/2023 11:06 PM  Attending Provider: Smiley Wayne MD    Bruno Brink or his authorized caregiver has made the decision for the patient to leave the emergency department against the advice of h  resident and attending physician. He or his authorized caregiver has been informed and understands the inherent risks, including death, vision loss, worsening headache, permanent disability. He or his authorized caregiver has decided to accept [facility] responsibility for this decision. Bruno Brink and all necessary parties have been advised that he may return for further evaluation or treatment. His condition at time of discharge was stable.   Bruno Brink had current vital signs as follows:  B P (!) 173/95   Pulse (!) 109   Temp 98.9 °F (37.2 °C) (Oral)   Resp 20

## 2023-08-16 ENCOUNTER — APPOINTMENT (EMERGENCY)
Dept: RADIOLOGY | Facility: HOSPITAL | Age: 23
End: 2023-08-16
Payer: COMMERCIAL

## 2023-08-16 VITALS
HEART RATE: 97 BPM | RESPIRATION RATE: 20 BRPM | TEMPERATURE: 98.9 F | OXYGEN SATURATION: 98 % | SYSTOLIC BLOOD PRESSURE: 167 MMHG | DIASTOLIC BLOOD PRESSURE: 72 MMHG

## 2023-08-16 LAB
ANION GAP SERPL CALCULATED.3IONS-SCNC: 6 MMOL/L
BASOPHILS # BLD AUTO: 0.08 THOUSANDS/ÂΜL (ref 0–0.1)
BASOPHILS NFR BLD AUTO: 1 % (ref 0–1)
BUN SERPL-MCNC: 16 MG/DL (ref 5–25)
CALCIUM SERPL-MCNC: 9.1 MG/DL (ref 8.3–10.1)
CHLORIDE SERPL-SCNC: 108 MMOL/L (ref 96–108)
CO2 SERPL-SCNC: 23 MMOL/L (ref 21–32)
CREAT SERPL-MCNC: 1.13 MG/DL (ref 0.6–1.3)
EOSINOPHIL # BLD AUTO: 0.22 THOUSAND/ÂΜL (ref 0–0.61)
EOSINOPHIL NFR BLD AUTO: 2 % (ref 0–6)
ERYTHROCYTE [DISTWIDTH] IN BLOOD BY AUTOMATED COUNT: 13.1 % (ref 11.6–15.1)
GFR SERPL CREATININE-BSD FRML MDRD: 89 ML/MIN/1.73SQ M
GLUCOSE SERPL-MCNC: 77 MG/DL (ref 65–140)
HCT VFR BLD AUTO: 50.1 % (ref 36.5–49.3)
HGB BLD-MCNC: 17 G/DL (ref 12–17)
IMM GRANULOCYTES # BLD AUTO: 0.11 THOUSAND/UL (ref 0–0.2)
IMM GRANULOCYTES NFR BLD AUTO: 1 % (ref 0–2)
LYMPHOCYTES # BLD AUTO: 2.6 THOUSANDS/ÂΜL (ref 0.6–4.47)
LYMPHOCYTES NFR BLD AUTO: 22 % (ref 14–44)
MCH RBC QN AUTO: 30.3 PG (ref 26.8–34.3)
MCHC RBC AUTO-ENTMCNC: 33.9 G/DL (ref 31.4–37.4)
MCV RBC AUTO: 89 FL (ref 82–98)
MONOCYTES # BLD AUTO: 1.15 THOUSAND/ÂΜL (ref 0.17–1.22)
MONOCYTES NFR BLD AUTO: 10 % (ref 4–12)
NEUTROPHILS # BLD AUTO: 7.68 THOUSANDS/ÂΜL (ref 1.85–7.62)
NEUTS SEG NFR BLD AUTO: 64 % (ref 43–75)
NRBC BLD AUTO-RTO: 0 /100 WBCS
PLATELET # BLD AUTO: 245 THOUSANDS/UL (ref 149–390)
PMV BLD AUTO: 10.9 FL (ref 8.9–12.7)
POTASSIUM SERPL-SCNC: 4.5 MMOL/L (ref 3.5–5.3)
RBC # BLD AUTO: 5.61 MILLION/UL (ref 3.88–5.62)
SODIUM SERPL-SCNC: 137 MMOL/L (ref 135–147)
WBC # BLD AUTO: 11.84 THOUSAND/UL (ref 4.31–10.16)

## 2023-08-16 PROCEDURE — 70450 CT HEAD/BRAIN W/O DYE: CPT

## 2023-08-16 PROCEDURE — 99285 EMERGENCY DEPT VISIT HI MDM: CPT | Performed by: EMERGENCY MEDICINE

## 2023-08-16 PROCEDURE — 96374 THER/PROPH/DIAG INJ IV PUSH: CPT

## 2023-08-16 PROCEDURE — G1004 CDSM NDSC: HCPCS

## 2023-08-16 PROCEDURE — 85025 COMPLETE CBC W/AUTO DIFF WBC: CPT

## 2023-08-16 PROCEDURE — 96375 TX/PRO/DX INJ NEW DRUG ADDON: CPT

## 2023-08-16 PROCEDURE — 36415 COLL VENOUS BLD VENIPUNCTURE: CPT

## 2023-08-16 PROCEDURE — 80048 BASIC METABOLIC PNL TOTAL CA: CPT

## 2023-08-16 RX ORDER — DIPHENHYDRAMINE HYDROCHLORIDE 50 MG/ML
50 INJECTION INTRAMUSCULAR; INTRAVENOUS ONCE
Status: COMPLETED | OUTPATIENT
Start: 2023-08-16 | End: 2023-08-16

## 2023-08-16 RX ORDER — ACETAMINOPHEN 325 MG/1
975 TABLET ORAL ONCE
Status: COMPLETED | OUTPATIENT
Start: 2023-08-16 | End: 2023-08-16

## 2023-08-16 RX ORDER — METOCLOPRAMIDE HYDROCHLORIDE 5 MG/ML
10 INJECTION INTRAMUSCULAR; INTRAVENOUS ONCE
Status: COMPLETED | OUTPATIENT
Start: 2023-08-16 | End: 2023-08-16

## 2023-08-16 RX ORDER — KETOROLAC TROMETHAMINE 30 MG/ML
15 INJECTION, SOLUTION INTRAMUSCULAR; INTRAVENOUS ONCE
Status: COMPLETED | OUTPATIENT
Start: 2023-08-16 | End: 2023-08-16

## 2023-08-16 RX ADMIN — METOCLOPRAMIDE HYDROCHLORIDE 10 MG: 5 INJECTION INTRAMUSCULAR; INTRAVENOUS at 01:22

## 2023-08-16 RX ADMIN — ACETAMINOPHEN 975 MG: 325 TABLET, FILM COATED ORAL at 01:22

## 2023-08-16 RX ADMIN — DIPHENHYDRAMINE HYDROCHLORIDE 50 MG: 50 INJECTION, SOLUTION INTRAMUSCULAR; INTRAVENOUS at 01:22

## 2023-08-16 RX ADMIN — KETOROLAC TROMETHAMINE 15 MG: 30 INJECTION, SOLUTION INTRAMUSCULAR; INTRAVENOUS at 01:22

## 2023-08-16 NOTE — ED PROVIDER NOTES
History  Chief Complaint   Patient presents with   • Migraine     Patient been having migraine since Friday. States it was getting better but came back worse 3 hours ago. Also states that he has a bump on the back of his head that started on Monday and has gotten bigger. States that the last time he had a migraine like this, it was excess CSF and he was also having neck and back pain. This time the neck pain is starting. Also states last time he lost part of his vision when this happened     HPI   Patient is a 75-year-old male with past medical history of obesity, depression, idiopathic intracranial hypertension, presenting to the ED for evaluation of a progressive headache that has been waxing and waning on the left side for the past 4 to 5 days. Patient reports associated phonophobia, photophobia, intermittent nausea. Patient denies fevers, chills, trauma, neck pain. Yesterday, patient noticed that he has small bump on the left side of his posterior scalp that was irritating. In July 2022, patient was diagnosed with idiopathic intracranial hypertension, and as consequence of that had complete loss of vision of the right eye and significantly decreased vision in the left eye, now at approximately 25%. Patient was prescribed Diamox and set up with a neurologist in [facility], but due to lack of follow-up secondary to transportation issues, patient was taken off Diamox in December 2022. Patient is concerned that his headache may be a consequence of his idiopathic intracranial hypertension, and that he may lose the remaining vision in his left eye. Denies tinnitus, paresthesias, difficulty ambulating, chest pain, shortness of breath, abdominal pain, nausea, vomiting, diarrhea. No recent viral symptoms. Prior to Admission Medications   Prescriptions Last Dose Informant Patient Reported?  Taking?   acetaZOLAMIDE (DIAMOX) 250 mg tablet   No No   Sig: Take 4 tablets (1,000 mg total) by mouth every 12 (twelve) hours   escitalopram (LEXAPRO) 10 mg tablet   No No   Sig: Take 1 tablet by mouth daily At 9AM      Facility-Administered Medications: None       Past Medical History:   Diagnosis Date   • Depression    • Psychiatric illness    • Self-injurious behavior     scratches on L forearm, started 2 days ago, denies previous self-injury       Past Surgical History:   Procedure Laterality Date   • FL LUMBAR PUNCTURE DIAGNOSTIC  7/14/2022   • IR LUMBAR PUNCTURE  7/16/2022       Family History   Problem Relation Age of Onset   • Bipolar disorder Mother    • Bipolar disorder Father      I have reviewed and agree with the history as documented. E-Cigarette/Vaping   • E-Cigarette Use Never User      E-Cigarette/Vaping Substances   • Nicotine No    • THC No    • CBD No    • Flavoring No    • Other No      Social History     Tobacco Use   • Smoking status: Every Day     Packs/day: 0.50     Years: 1.00     Total pack years: 0.50     Types: Cigarettes   • Smokeless tobacco: Never   Vaping Use   • Vaping Use: Never used   Substance Use Topics   • Alcohol use: No   • Drug use: Yes     Frequency: 1.0 times per week     Types: Marijuana        Review of Systems   All other systems reviewed and are negative. Physical Exam  ED Triage Vitals   Temperature Pulse Respirations Blood Pressure SpO2   08/15/23 2303 08/15/23 2303 08/15/23 2303 08/15/23 2303 08/15/23 2303   98.9 °F (37.2 °C) (!) 109 20 (!) 173/95 97 %      Temp Source Heart Rate Source Patient Position - Orthostatic VS BP Location FiO2 (%)   08/15/23 2303 08/15/23 2303 08/16/23 0212 08/16/23 0212 --   Oral Monitor Lying Left arm       Pain Score       08/15/23 2303       5             Orthostatic Vital Signs  Vitals:    08/15/23 2303 08/16/23 0212   BP: (!) 173/95 167/72   Pulse: (!) 109 97   Patient Position - Orthostatic VS:  Lying       Physical Exam  Vitals and nursing note reviewed. Constitutional:       General: He is not in acute distress.      Appearance: Normal appearance. He is well-developed. He is obese. He is not ill-appearing or toxic-appearing. HENT:      Head: Normocephalic and atraumatic. Comments: Small area of scalp irritation at the L posterior-parietal region consistent with irritated hair follicle. No surrounding cellulitis or fluctuance. Right Ear: External ear normal.      Left Ear: External ear normal.      Nose: Nose normal. No congestion. Mouth/Throat:      Mouth: Mucous membranes are moist.      Pharynx: Oropharynx is clear. Eyes:      General: No scleral icterus. Conjunctiva/sclera: Conjunctivae normal.      Comments: R pupil nonreactive; L pupil 3 mm reactive    EOMI L eye; chronic blindness R eye     Cardiovascular:      Rate and Rhythm: Normal rate and regular rhythm. Pulses: Normal pulses. Heart sounds: Normal heart sounds. No murmur heard. Pulmonary:      Effort: Pulmonary effort is normal. No respiratory distress. Breath sounds: Normal breath sounds. No wheezing, rhonchi or rales. Abdominal:      Palpations: Abdomen is soft. Tenderness: There is no abdominal tenderness. Musculoskeletal:         General: No swelling. Normal range of motion. Cervical back: Normal range of motion and neck supple. No rigidity or tenderness. Right lower leg: No edema. Left lower leg: No edema. Skin:     General: Skin is warm and dry. Capillary Refill: Capillary refill takes less than 2 seconds. Findings: No erythema. Neurological:      General: No focal deficit present. Mental Status: He is alert and oriented to person, place, and time. Cranial Nerves: No cranial nerve deficit. Sensory: No sensory deficit. Motor: No weakness.       Coordination: Coordination normal.      Gait: Gait normal.      Comments: B;lindness R eye; otherwise neuro exam normal.      Psychiatric:         Mood and Affect: Mood normal.         ED Medications  Medications   ketorolac (TORADOL) injection 15 mg (15 mg Intravenous Given 8/16/23 0122)   acetaminophen (TYLENOL) tablet 975 mg (975 mg Oral Given 8/16/23 0122)   metoclopramide (REGLAN) injection 10 mg (10 mg Intravenous Given 8/16/23 0122)   diphenhydrAMINE (BENADRYL) injection 50 mg (50 mg Intravenous Given 8/16/23 0122)       Diagnostic Studies  Results Reviewed     Procedure Component Value Units Date/Time    Basic metabolic panel [[id number]] Collected: 08/16/23 0004    Lab Status: Final result Specimen: Blood from Arm, Right Updated: 08/16/23 0039     Sodium 137 mmol/L      Potassium 4.5 mmol/L      Chloride 108 mmol/L      CO2 23 mmol/L      ANION GAP 6 mmol/L      BUN 16 mg/dL      Creatinine 1.13 mg/dL      Glucose 77 mg/dL      Calcium 9.1 mg/dL      eGFR 89 ml/min/1.73sq m     Narrative:      [facility] (CKD):   •  Stage 1 with normal or high GFR (GFR > 90 mL/min/1.73 square meters)  •  Stage 2 Mild CKD (GFR = 60-89 mL/min/1.73 square meters)  •  Stage 3A Moderate CKD (GFR = 45-59 mL/min/1.73 square meters)  •  Stage 3B Moderate CKD (GFR = 30-44 mL/min/1.73 square meters)  •  Stage 4 Severe CKD (GFR = 15-29 mL/min/1.73 square meters)  •  Stage 5 End Stage CKD (GFR <15 mL/min/1.73 square meters)  Note: GFR calculation is accurate only with a steady state creatinine    CBC and differential [[id number]]  (Abnormal) Collected: 08/16/23 0004    Lab Status: Final result Specimen: Blood from Arm, Right Updated: 08/16/23 0011     WBC 11.84 Thousand/uL      RBC 5.61 Million/uL      Hemoglobin 17.0 g/dL      Hematocrit 50.1 %      MCV 89 fL      MCH 30.3 pg      MCHC 33.9 g/dL      RDW 13.1 %      MPV 10.9 fL      Platelets 192 Thousands/uL      nRBC 0 /100 WBCs      Neutrophils Relative 64 %      Immat GRANS % 1 %      Lymphocytes Relative 22 %      Monocytes Relative 10 %      Eosinophils Relative 2 %      Basophils Relative 1 %      Neutrophils Absolute 7.68 Thousands/µL      Immature Grans Absolute 0.11 Thousand/uL      Lymphocytes Absolute 2.60 Thousands/µL      Monocytes Absolute 1.15 Thousand/µL      Eosinophils Absolute 0.22 Thousand/µL      Basophils Absolute 0.08 Thousands/µL                  CT head without contrast   Final Result by Caden Kemp MD (08/16 0205)      No acute intracranial abnormality. Workstation performed: [id number]               Procedures  Procedures      ED Course  ED Course as of 08/16/23 0812   Wed Aug 16, 2023   0152 On re-evaluation, patient reports significant relief from his headache. Would like to be discharged. 0157 WBC(!): 11.84  Likely leukomoid reaction from pain     0236 Discussed with patient that I would like him to stay until the morning even though his headache has improved due to his history of IIH and concern for vision loss. I offered for patient to have the LP attempted in the ED, but patient would rather have IR do it. As this is not emergent case, IR will be available in the morning. Patient would like to be discharged as his headache has resolved. Patient is willing to sign out AMA. Pt insists on leaving AMA despite my recommendation to remain for ongoing treatment.    1: Capacity: I have determined that the patient has capacity to make the decision to leave against medical advice based on the following:    A. Ability to express a choice: The patient is able to express his or her choice and communicate that choice. Serjio Napoleser to understand relevant information: The patient is able to verbalize their diagnosis, understand information about the purpose of treatment, remember the information, and show that he or she can be part of the decision-making process.    C. Ability to appreciate the significance of the information and its consequences: The patient understands the consequences of treatment refusal and the risks and benefits of accepting or refusing treatment.    D.  Ability to manipulate information: The patient is able to engage in reasoning as it applies to making treatment decisions   2: Psychiatric Consultation: There is not an indication to call psychiatry consultation to determine capacity    3. Alternative Treatment: I have discussed the recommended course of treatment and available alternatives. 4. Risks: I have discussed the specific risks of that patient refusing treatment; vision loss, HA   5. Follow-up Care: I have discussed the follow-up care and advised to see patient's PCP immediately or return here for worsening. 6. ED Option: I have emphasized that the patient has the option to return to the ED. Reviewed that we can have continuation of the workup at any time and that we are always open. 4179 Patient observed ambulatory from the ED       Patient presents with past medical history of idiopathic intracranial hypertension, coming in for evaluation of 4 to 5-day history of progressive headache, left-sided, associate with photophobia and photophobia. 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 Patient was previously on Diamox, but due to transportation issues with his neurology appointments, he was taken off of Diamox due to lack of follow-up. We will obtain basic lab testing, as well as a CT head to evaluate ventricle size. Had a discussion with the patient that I would recommend LP to determine opening pressure that would be consistent with idiopathic intracranial hypertension. On previous chart review, it appears that patient had an LP done under IR guided fluoroscopy. Due to patient's body habitus, a successful LP would be unlikely at this time. I offered for patient to have the LP done by myself in the emergency department, but patient is more comfortable with having IR do the procedure. It is likely that they have the equipment available to accommodate patient's body habitus.   We will treat patient's headache like a migraine, administering fluids, Toradol, Reglan. Advised the patient remain in the ER overnight until IR can be consulted in the morning for LP. Patient is agreeable with that plan. On reevaluation, patient reports significant improvement of his headache. He is ambulatory in the ED without issue. He would like to go home at this time. See discussion with the patient above regarding AMA. Ultimately, patient decided to leave AMA and was ambulatory at discharge. An ambulatory referral to neurology was placed. SBIRT 22yo+    Flowsheet Row Most Recent Value   Initial Alcohol Screen: US AUDIT-C     1. How often do you have a drink containing alcohol? 0 Filed at: 08/15/2023 2321   2. How many drinks containing alcohol do you have on a typical day you are drinking? 0 Filed at: 08/15/2023 2321   3a. Male UNDER 65: How often do you have five or more drinks on one occasion? 0 Filed at: 08/15/2023 2321   3b. FEMALE Any Age, or MALE 65+: How often do you have 4 or more drinks on one occassion? 0 Filed at: 08/15/2023 2321   Audit-C Score 0 Filed at: 08/15/2023 2321   JEIMY: How many times in the past year have you. .. Used an illegal drug or used a prescription medication for non-medical reasons?  Never Filed at: 08/15/2023 2321                MDM      Disposition  Final diagnoses:   Migraine headache   IIH (idiopathic intracranial hypertension) history     Time reflects when diagnosis was documented in both MDM as applicable and the Disposition within this note     Time User Action Codes Description Comment    8/16/2023  2:13 AM French Barrett Add [G43.909] Migraine headache     8/16/2023  2:13 AM French Barrett Add [G93.2] IIH (idiopathic intracranial hypertension)     8/16/2023  2:13 AM French Barrett Modify [G93.2] IIH (idiopathic intracranial hypertension) history       ED Disposition     ED Disposition   AMA    Condition   --    Date/Time   Wed Aug 16, 2023  2:15 AM    Comment   Date: 8/16/2023  Patient: Mariah Dolan  Admitted: 8/15/2023 11:06 PM  Attending Provider: Demetrius Rossi MD    Mariah Dolan or his authorized caregiver has made the decision for the patient to leave the emergency department against the advice of h is resident and attending physician. He or his authorized caregiver has been informed and understands the inherent risks, including death, vision loss, worsening headache, permanent disability. He or his authorized caregiver has decided to accept [facility] responsibility for this decision. Mariah Dolan and all necessary parties have been advised that he may return for further evaluation or treatment. His condition at time of discharge was stable. Mariah Dolan had current vital signs as follows:  B P (!) 167/95   Pulse (!) 97    O2 sat 98%         Follow-up Information     Follow up With Specialties Details Why Contact Info    Ayesha Frye MD Neurology Schedule an appointment as soon as possible for a visit   [address] [place] [address]  [phone]            Discharge Medication List as of 8/16/2023  2:15 AM      CONTINUE these medications which have NOT CHANGED    Details   acetaZOLAMIDE (DIAMOX) 250 mg tablet Take 4 tablets (1,000 mg total) by mouth every 12 (twelve) hours, Starting Wed 7/20/2022, Normal      escitalopram (LEXAPRO) 10 mg tablet Take 1 tablet by mouth daily At 9AM, Starting 6/2/2017, Until Discontinued, Print               PDMP Review     None           ED Provider  Attending physically available and evaluated Mariah Dolan. I managed the patient along with the ED Attending.     Electronically Signed by         Xochilt Amanda DO  08/16/23 8471

## 2023-08-16 NOTE — ED ATTENDING ATTESTATION
8/15/2023  INichol DO, saw and evaluated the patient. I have discussed the patient with the resident/non-physician practitioner and agree with the resident's/non-physician practitioner's findings, Plan of Care, and MDM as documented in the resident's/non-physician practitioner's note, except where noted. All available labs and Radiology studies were reviewed. I was present for key portions of any procedure(s) performed by the resident/non-physician practitioner and I was immediately available to provide assistance. At this point I agree with the current assessment done in the Emergency Department. I have conducted an independent evaluation of this patient a history and physical is as follows:      Patient is a 53-year-old male with a history of idiopathic intracranial hypertension, increased BMI, depression, says 4 days ago he began having a waxing and waning left-sided headache, some mild phonophobia, no photophobia, no trauma, no fever or chills, not maximal in onset. Yesterday he noticed a small bump on the left posterior side of his scalp behind his ear. No travel history, no known sick with similar symptoms at home, no recent head or neck infections or head or neck surgery. He started having blurred vision when he was 21 out of his right eye, then in July 2023, was seen and evaluated and diagnosed with idiopathic intracranial hypertension, had complete loss of vision out of the right eye and significant decreased vision in the left eye. He underwent a operative procedure on the left eye which changed his vision from dark blurry shapes to lighter shapes and able to have slight improved vision. He said his vision has been unchanged since that procedure was done July 2022. He says he normally has no light perception or pupillary response in his right eye, left eye significant decreased vision.   He had been on acetazolamide from neurology in [facility], but was unable to be able to easily be seen there due to transportation issues, stopped following with them and says his acetazolamide was stopped in December 2022. General:  Patient is well-appearing  Head:  Atraumatic, on the patient's left posterior scalp is a small furuncle, no purulent drainage or discharge, no palpable abscess, scant amount of erythema, 5 mm in diameter. Eyes:  Conjunctiva pink, Extraocular muscle intact, right pupil is nonreactive, left pupil is round and reactive to light. ENT:  Mucous membranes are moist  Neck:  Supple  Cardiac:  S1-S2, without murmurs  Lungs:  Clear to auscultation bilaterally  Abdomen:  Soft, nontender, normal bowel sounds, no CVA tenderness, no tympany, no rigidity, no guarding  Extremities:  Normal range of motion  Neurologic:  Awake, fluent speech, normal comprehension. AAOx3. Cranial nerves 2-12 are intact, strength is 5/5 in the bilateral upper & lower extremities, no slurred speech, no facial droop, no deficit on finger-to-nose testing, no pronator drift. Sensation to light touch is equal and symmetric throughout the whole body  Back:No midline thoracic, lumbar, sacral tenderness, deformities, or step-offs. Skin:  Pink warm and dry, no rash  Psychiatric:  Alert, pleasant, cooperative    ED Course     Do not believe patient's symptoms are secondary to carbon monoxide exposure or meningitis or other environmental factors. Plan is for CT of the head to evaluate for the possibility of tumor, mass or other evidence of idiopathic intracranial hypertension. Given the patient's body habitus, do not believe performing a lumbar puncture in the ED would be appropriate, will plan to have patient receive symptomatic management, and remain in the ED for fluoroscopic guided lumbar puncture. Signed out to Dr. Vidhi Curiel    Patient presents with acute new problem with:  Threat to life or bodily function      Chronic conditions affecting care:  As per HPI    COLLECTION AND INTERPRETATION OF DATA  Additional history obtained from: Sister who accompanies him  I reviewed prior external notes, including July 2022 hospital discharge summary    I ordered each unique test  Tests reviewed personally by me:    Tests considered but not ordered: See above      Social Determinants of Health:  Presentation to ED outside of business hours or on night shift      Critical Care Time  Procedures

## 2023-08-16 NOTE — DISCHARGE INSTRUCTIONS
Please follow up with neurologist as directed. Continue to take Motrin and Tylenol for headache control. Return to the ED if you develop worsening headache, change in vision from baseline, dizziness, vomiting, confusion, fevers, chills.

## 2025-05-25 NOTE — ED NOTES
Pt reports his vision is improving in both eyes but not at baseline at this point     Jono Jenkins RN  07/14/22 8728 No